# Patient Record
Sex: FEMALE | Race: WHITE | Employment: OTHER | URBAN - METROPOLITAN AREA
[De-identification: names, ages, dates, MRNs, and addresses within clinical notes are randomized per-mention and may not be internally consistent; named-entity substitution may affect disease eponyms.]

---

## 2017-12-22 PROBLEM — R55 SYNCOPE, NEAR: Status: ACTIVE | Noted: 2017-12-22

## 2017-12-22 PROBLEM — R77.8 ELEVATED TROPONIN: Status: ACTIVE | Noted: 2017-12-22

## 2017-12-22 PROBLEM — R79.89 ELEVATED TROPONIN: Status: ACTIVE | Noted: 2017-12-22
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
## 2017-12-23 PROBLEM — I25.10 CAD (CORONARY ARTERY DISEASE): Status: ACTIVE | Noted: 2017-12-23

## 2017-12-26 ENCOUNTER — TELEPHONE (OUTPATIENT)
Dept: CARDIOLOGY | Age: 82
End: 2017-12-26

## 2018-01-03 ENCOUNTER — OFFICE VISIT (OUTPATIENT)
Dept: CARDIOLOGY CLINIC | Age: 83
End: 2018-01-03

## 2018-01-03 ENCOUNTER — NURSE ONLY (OUTPATIENT)
Dept: CARDIOLOGY CLINIC | Age: 83
End: 2018-01-03

## 2018-01-03 VITALS
HEIGHT: 68 IN | OXYGEN SATURATION: 95 % | HEART RATE: 71 BPM | WEIGHT: 170 LBS | BODY MASS INDEX: 25.76 KG/M2 | SYSTOLIC BLOOD PRESSURE: 148 MMHG | DIASTOLIC BLOOD PRESSURE: 70 MMHG

## 2018-01-03 DIAGNOSIS — R55 SYNCOPE AND COLLAPSE: ICD-10-CM

## 2018-01-03 DIAGNOSIS — I25.10 CORONARY ARTERY DISEASE INVOLVING NATIVE CORONARY ARTERY OF NATIVE HEART WITHOUT ANGINA PECTORIS: Primary | ICD-10-CM

## 2018-01-03 DIAGNOSIS — E78.2 MIXED HYPERLIPIDEMIA: ICD-10-CM

## 2018-01-03 PROCEDURE — 1111F DSCHRG MED/CURRENT MED MERGE: CPT | Performed by: NURSE PRACTITIONER

## 2018-01-03 PROCEDURE — 1123F ACP DISCUSS/DSCN MKR DOCD: CPT | Performed by: NURSE PRACTITIONER

## 2018-01-03 PROCEDURE — G8427 DOCREV CUR MEDS BY ELIG CLIN: HCPCS | Performed by: NURSE PRACTITIONER

## 2018-01-03 PROCEDURE — 4040F PNEUMOC VAC/ADMIN/RCVD: CPT | Performed by: NURSE PRACTITIONER

## 2018-01-03 PROCEDURE — 1036F TOBACCO NON-USER: CPT | Performed by: NURSE PRACTITIONER

## 2018-01-03 PROCEDURE — G8484 FLU IMMUNIZE NO ADMIN: HCPCS | Performed by: NURSE PRACTITIONER

## 2018-01-03 PROCEDURE — 99214 OFFICE O/P EST MOD 30 MIN: CPT | Performed by: NURSE PRACTITIONER

## 2018-01-03 PROCEDURE — 1090F PRES/ABSN URINE INCON ASSESS: CPT | Performed by: NURSE PRACTITIONER

## 2018-01-03 PROCEDURE — G8419 CALC BMI OUT NRM PARAM NOF/U: HCPCS | Performed by: NURSE PRACTITIONER

## 2018-01-03 PROCEDURE — G8598 ASA/ANTIPLAT THER USED: HCPCS | Performed by: NURSE PRACTITIONER

## 2018-01-03 RX ORDER — MULTIVIT WITH MINERALS/LUTEIN
1000 TABLET ORAL DAILY
COMMUNITY
End: 2018-06-12 | Stop reason: ALTCHOICE

## 2018-01-03 RX ORDER — ASCORBIC ACID 500 MG
1000 TABLET ORAL DAILY
COMMUNITY

## 2018-01-03 NOTE — COMMUNICATION BODY
170 lb (77.1 kg)    Height: 5' 8\" (1.727 m)         VITALS:  BP (!) 148/70 (Site: Left Arm, Cuff Size: Medium Adult)   Pulse 71   Ht 5' 8\" (1.727 m)   Wt 170 lb (77.1 kg)   SpO2 95%   BMI 25.85 kg/m²      CONSTITUTIONAL: Cooperative, no apparent distress, and appears well nourished / developed  NEUROLOGIC:  Awake and orientated to person, place and time. PSYCH: Calm affect. SKIN: Warm and dry; Rt groin unremarkable ; mod ecchymosis Rt lateral thigh . HEENT: Sclera non-icteric, normocephalic, neck supple, no elevation of JVP, normal carotid pulses with no bruits and thyroid normal size. LUNGS:  No increased work of breathing and clear to auscultation, no crackles or wheezing. CARDIOVASCULAR:  Regular rate 72 and rhythm with no murmurs, gallops, rubs, or abnormal heart sounds, normal PMI. The apical impulses not displaced. Heart tones are crisp and normal                                                                                            Cervical veins are not engorged                 JVP less than 8 cm H2O                                                                              The carotid upstroke is normal in amplitude and contour without delay or bruit    ABDOMEN:  Normal bowel sounds, non-distended and non-tender to palpation   EXT: No edema, no calf tenderness. Pulses are present bilaterally.     DATA:    Lab Results   Component Value Date    ALT 14 12/22/2017    AST 24 12/22/2017    ALKPHOS 79 12/22/2017    BILITOT 0.4 12/22/2017     Lab Results   Component Value Date    CREATININE <0.5 (L) 12/23/2017    BUN 11 12/23/2017     (L) 12/23/2017    K 3.9 12/23/2017     12/23/2017    CO2 21 12/23/2017     No results found for: TSH, N8XHGHB, A6BXVNG, THYROIDAB  Lab Results   Component Value Date    WBC 6.9 12/23/2017    HGB 13.3 12/23/2017    HCT 39.4 12/23/2017    MCV 88.3 12/23/2017     12/23/2017     No components found for: CHLPL  Lab Results Component Value Date    TRIG 71 12/23/2017     Lab Results   Component Value Date    HDL 76 (H) 12/23/2017     Lab Results   Component Value Date    LDLCALC 101 (H) 12/23/2017     Lab Results   Component Value Date    LABVLDL 14 12/23/2017     Lab Results   Component Value Date    TROPONINI 0.11 (H) 12/23/2017       Radiology Review:  Pertinent images / reports were reviewed as a part of this visit and reveals the following:    Last Echo: 12/23/17:  Summary   Normal left ventricle size and systolic function with an estimated ejection fraction of 50-55%. Mild inferoapical hypokinesis   There is reversal of E/A inflow velocities across the mitral valve.   There is mild concentric left ventricular hypertrophy.   Trivial mitral regurgitation is present.  Suann  is mild tricuspid regurgitation with RVSP estimated at 32 mmHg. Last Angiogram: 12/22/17:  Coronary Findings  LM       Normal   LAD     40% mid  Cx        50% proximal, 80% ostial OM2  RCA     ectatic  LVG     35%, distal 50% akinetic, hyperdynamic basal segments  EDP     6mmHg     Coronary Intervention  PCI OM2 with [id number] to 14atm      Assessment:     1. Coronary artery disease involving native coronary artery of native heart without angina pectoris   ~stable: s/p PTCA OM-2 ; non-obst disease of the LAD and prox-CX  ~LVG   : EF 35%, distal 50% akinetic, hyperdynamic basal segments  ~ ejection fraction of 50-55%. Mild inferoapical hypokinesis post-revascularization by echo  ~DAPT / statin / BB  ~dry cough at HS likely from lisinopril. Currently not bothersome so prefers not to change to an ARB    2. Syncope and collapse   ~denies recurrence  ~atypical complaints of light headedness triggered by stress / anxiety   ~Holter: April '08 (done for c/o irregular HB): sinus rhythm, avg HR 88 min 63 max 113; supraventricular ectopy < 1%; ventricular ectopy 4% Cardiac event monitor   3.  Mixed hyperlipidemia   ~new  ~atorvastatin started : LDL goal < 100 LIPID PANEL    Comprehensive Metabolic Panel       Patient  is stable since hospital discharge. Plan: 30 day Event : c/o light headedness with episode of syncope PTA   Fasting lipid profile / CMP in one month     F/U in 5-6 weeks     Thank you for allowing to us to participate in the care of Arnulfo Delfinosuzanne.       [facility]

## 2018-01-03 NOTE — LETTER
[facility] - [address] [place] [address] [address] [address]  Phone: [phone]  Fax: [phone]    Karyle Fredrickson, NP        January 3, 2018     Wandy Cancino, [address] [facility] [address]    Patient: Al Ro  MR Number: [id number]  YOB: 1930  Date of Visit: 1/3/2018    Dear Dr. Saba Like:        [employer]     Outpatient Follow Up Choudhary Lock / HPI: Hospital Follow Up secondary to status post coronary artery stenting    Hospital record has been reviewed  Hospital Course progressed as follows per discharge summary:   ~pt brought in by ann marie Wayne) after falling in [place]. pt states her glasses broke last night and she is wearing on old pair of lenses. pt not sure exactly what happened but denies any dizziness, chest pain or head pain before the fall. The patient was admitted after having an unwitnessed fall vs  near syncope  while shopping at [employer]. She was brought by EMS and admitted. She was followed by cardiology and had a cath which showed: CAD   ~s/p PTCA 80% ostial OM2  ~35%, distal 50% akinetic, hyperdynamic basal segments by cath     She was treated with low dose bb and ace and was started on plavix post cath. She will follow up with cardiology for a 30 day event monitor. She was feeling better and was ambulating in the room prior to discharge. She denied any dizziness with ambulation. She was very eager to be released and declined any home care services.        Al Ro is 80 y.o. female who presents today for a routine follow up after a recent hospitalization related to the above mentioned issues. She recalls being at [place] then being told that she passed out. She remembers not feeling right, a little light headed (she attributed it to wearing an old pair of glasses). She was not having palpitations.   Subjective: Since the time of discharge, the patient admits their symptoms have improved. She's felt very good since discharge except she becomes more stressed easily. She describes feeling light headed thinking about things; maybe a little panic attack type response. She had been given ativan in the past that she rarely uses. She's worried about her son's knee procedure. She denies significant chest pain. There is SOB walking to get her meals; more tired than SOB. She denies orthopnea/PND. She denies swelling. Her weight is stable. The patient is not experiencing palpitations. These symptoms are improving over the last few days. With regard to medication therapy the patient has been compliant with prescribed regimen. They have tolerated therapy to date.      Current Outpatient Prescriptions   Medication Sig Dispense Refill    vitamin E 1000 units capsule Take 1,000 Units by mouth daily      vitamin C (ASCORBIC ACID) 500 MG tablet Take 500 mg by mouth daily      Glucosamine-Chondroit-Vit C-Mn (GLUCOSAMINE CHONDR 500 COMPLEX PO) Take 500 mg by mouth 2 times daily      atorvastatin (LIPITOR) 80 MG tablet Take 1 tablet by mouth nightly 30 tablet 3    lisinopril (PRINIVIL;ZESTRIL) 2.5 MG tablet Take 1 tablet by mouth daily 30 tablet 3    carvedilol (COREG) 3.125 MG tablet Take 1 tablet by mouth 2 times daily (with meals) (Patient taking differently: Take 3.125 mg by mouth 2 times daily (with meals) Pt taking 1/2 tablet bid) 60 tablet 3    clopidogrel (PLAVIX) 75 MG tablet Take 1 tablet by mouth daily 30 tablet 3    aspirin 81 MG chewable tablet Take 1 tablet by mouth daily 30 tablet 3    vitamin D (CHOLECALCIFEROL) 1000 UNIT TABS tablet Take 1,000 Units by mouth daily      b complex vitamins capsule Take 1 capsule by mouth daily      Coenzyme Q10 (COQ10 PO) Take by mouth      LORazepam (ATIVAN) 0.5 MG tablet       ketoconazole (NIZORAL) 2 % cream       hydrocortisone 2.5 % cream

## 2018-01-03 NOTE — PATIENT INSTRUCTIONS
Ref. Range 12/23/2017 02:55   Cholesterol, Total Latest Ref Range: 0 - 199 mg/dL 191   HDL Cholesterol Latest Ref Range: 40 - 60 mg/dL 76 (H)   LDL Calculated Latest Ref Range: <100 mg/dL 101 (H)   Triglycerides Latest Ref Range: 0 - 150 mg/dL 71     Fasting cholesterol levels in about a month    30 day heart monitor    appt in six weeks     Stop taking Vitamin-E : it has properties similar to aspirin

## 2018-02-02 PROCEDURE — 93228 REMOTE 30 DAY ECG REV/REPORT: CPT | Performed by: INTERNAL MEDICINE

## 2018-02-05 ENCOUNTER — TELEPHONE (OUTPATIENT)
Dept: CARDIOLOGY CLINIC | Age: 83
End: 2018-02-05

## 2018-02-09 ENCOUNTER — OFFICE VISIT (OUTPATIENT)
Dept: CARDIOLOGY CLINIC | Age: 83
End: 2018-02-09

## 2018-02-09 VITALS
DIASTOLIC BLOOD PRESSURE: 72 MMHG | HEIGHT: 68 IN | SYSTOLIC BLOOD PRESSURE: 138 MMHG | BODY MASS INDEX: 25.61 KG/M2 | OXYGEN SATURATION: 94 % | HEART RATE: 86 BPM | WEIGHT: 169 LBS

## 2018-02-09 DIAGNOSIS — E78.2 MIXED HYPERLIPIDEMIA: ICD-10-CM

## 2018-02-09 DIAGNOSIS — R55 SYNCOPE AND COLLAPSE: Primary | ICD-10-CM

## 2018-02-09 DIAGNOSIS — I25.10 CORONARY ARTERY DISEASE INVOLVING NATIVE CORONARY ARTERY OF NATIVE HEART WITHOUT ANGINA PECTORIS: ICD-10-CM

## 2018-02-09 PROCEDURE — 93000 ELECTROCARDIOGRAM COMPLETE: CPT | Performed by: NURSE PRACTITIONER

## 2018-02-09 PROCEDURE — 1090F PRES/ABSN URINE INCON ASSESS: CPT | Performed by: NURSE PRACTITIONER

## 2018-02-09 PROCEDURE — 99214 OFFICE O/P EST MOD 30 MIN: CPT | Performed by: NURSE PRACTITIONER

## 2018-02-09 PROCEDURE — G8419 CALC BMI OUT NRM PARAM NOF/U: HCPCS | Performed by: NURSE PRACTITIONER

## 2018-02-09 PROCEDURE — G8484 FLU IMMUNIZE NO ADMIN: HCPCS | Performed by: NURSE PRACTITIONER

## 2018-02-09 PROCEDURE — G8427 DOCREV CUR MEDS BY ELIG CLIN: HCPCS | Performed by: NURSE PRACTITIONER

## 2018-02-09 PROCEDURE — 1036F TOBACCO NON-USER: CPT | Performed by: NURSE PRACTITIONER

## 2018-02-09 PROCEDURE — G8598 ASA/ANTIPLAT THER USED: HCPCS | Performed by: NURSE PRACTITIONER

## 2018-02-09 PROCEDURE — 1123F ACP DISCUSS/DSCN MKR DOCD: CPT | Performed by: NURSE PRACTITIONER

## 2018-02-09 PROCEDURE — 4040F PNEUMOC VAC/ADMIN/RCVD: CPT | Performed by: NURSE PRACTITIONER

## 2018-02-09 RX ORDER — LOSARTAN POTASSIUM 25 MG/1
25 TABLET ORAL DAILY
Qty: 30 TABLET | Refills: 5 | Status: SHIPPED | OUTPATIENT
Start: 2018-02-09 | End: 2018-08-16 | Stop reason: SDUPTHER

## 2018-02-09 NOTE — LETTER
[address] [facility]  [address]  [address] [address] [address]  Phone: [phone]  Fax: [phone]    Leonides Esposito NP        February 9, 2018     Yolis Knowles, [address] [facility] [address]    Patient: Colleen Norton  MR Number: [id number]  YOB: 1930  Date of Visit: 2/9/2018    Dear Dr. Yolis Knowles:      [address]     Outpatient Follow Up Note    Colleen Norton is 80 y.o. female who presents today with a history of CAD s/p PTCA OM-2 Dec '17, CM/EF 35% improved to 50-55% with mild inferoapical HK on echo POD #1, syncope and hyperlipidemia. CHIEF COMPLAINT / HPI:  Follow Up secondary to syncope PTA / 30 day heart monitor. Subjective:   she denies significant chest pain. There is no SOB/ZIMMERMAN. The patient denies orthopnea/PND. The patient does not have swelling. The patients weight is unchanged . The patient is not experiencing palpitations or dizziness. She becomes light headedness when she get excited about anything. She feels dizzy bending over (at the waist). These symptoms are stable since the last OV. With regard to medication therapy the patient has been compliant with prescribed regimen. They have tolerated therapy to date.        Current Outpatient Prescriptions   Medication Sig Dispense Refill    vitamin E 1000 units capsule Take 1,000 Units by mouth daily      vitamin C (ASCORBIC ACID) 500 MG tablet Take 500 mg by mouth daily      Glucosamine-Chondroit-Vit C-Mn (GLUCOSAMINE CHONDR 500 COMPLEX PO) Take 500 mg by mouth 2 times daily      atorvastatin (LIPITOR) 80 MG tablet Take 1 tablet by mouth nightly 30 tablet 3    lisinopril (PRINIVIL;ZESTRIL) 2.5 MG tablet Take 1 tablet by mouth daily 30 tablet 3    carvedilol (COREG) 3.125 MG tablet Take 1 tablet by mouth 2 times daily (with meals) (Patient taking differently: Take 3.125 mg by mouth 2 times daily (with meals) Pt taking 1/2 tablet bid) 60 tablet 3 Radiology Review:  Pertinent images / reports were reviewed as a part of this visit and reveals the followin day Event monitor: 1/3 - 18:   ~avg HR 76 min 54 max 120    ~PVC burden 1%   ~9 bts PSVT (asymptomatic)   ~symptoms: c/o light headedness in sinus rhythm      Assessment:     1. Syncope and collapse   ~denies recurrence  ~c/o dizziness bending at the waist  ~Event monitor: sinus rhythm, PVC burden 1%; 9 bts PSVT asymptomatic  ~low dose carvedilol / ASA   ~Holter:  (done for c/o irregular HB): sinus rhythm, avg HR 88 min 63 max 113; supraventricular ectopy < 1%; ventricular ectopy 4%  EKG 12 Lead   2. Coronary artery disease involving native coronary artery of native heart without angina pectoris   ~s/p PTCA OM  ~denies chest pain   ~DAPT / BB EKG 12 Lead   3. Mixed hyperlipidemia   ~LDL near goal; HDL high   ~atorvastatin 80 mg daily           I had the opportunity to review the clinical symptoms and presentation of Sadie Radford. Plan:     1. EKG: sinus rhythm. ICRBBB  2. Stop lisinopril d/t cough-tickle   Begin losartan 25 mg daily  3. F/U in 3-4 months    Overall the patient is stable from CV standpoint    Thank you for allowing to us to participate in the care of Sadie Radford. If you have questions, please do not hesitate to call me. I look forward to following Trista Roman along with you.     Sincerely,        Chad Dumont NP

## 2018-02-09 NOTE — PROGRESS NOTES
[address]     Outpatient Follow Up Note    Rexine Screen is 80 y.o. female who presents today with a history of CAD s/p PTCA OM-2 Dec '17, CM/EF 35% improved to 50-55% with mild inferoapical HK on echo POD #1, syncope and hyperlipidemia. CHIEF COMPLAINT / HPI:  Follow Up secondary to syncope PTA / 30 day heart monitor. Subjective:   she denies significant chest pain. There is no SOB/ZIMMERMAN. The patient denies orthopnea/PND. The patient does not have swelling. The patients weight is unchanged . The patient is not experiencing palpitations or dizziness. She becomes light headedness when she get excited about anything. She feels dizzy bending over (at the waist). These symptoms are stable since the last OV. With regard to medication therapy the patient has been compliant with prescribed regimen. They have tolerated therapy to date. History reviewed. No pertinent past medical history.   Social History:    History   Smoking Status    Former Smoker   Smokeless Tobacco    Never Used     Current Medications:  Current Outpatient Prescriptions   Medication Sig Dispense Refill    vitamin E 1000 units capsule Take 1,000 Units by mouth daily      vitamin C (ASCORBIC ACID) 500 MG tablet Take 500 mg by mouth daily      Glucosamine-Chondroit-Vit C-Mn (GLUCOSAMINE CHONDR 500 COMPLEX PO) Take 500 mg by mouth 2 times daily      atorvastatin (LIPITOR) 80 MG tablet Take 1 tablet by mouth nightly 30 tablet 3    lisinopril (PRINIVIL;ZESTRIL) 2.5 MG tablet Take 1 tablet by mouth daily 30 tablet 3    carvedilol (COREG) 3.125 MG tablet Take 1 tablet by mouth 2 times daily (with meals) (Patient taking differently: Take 3.125 mg by mouth 2 times daily (with meals) Pt taking 1/2 tablet bid) 60 tablet 3    clopidogrel (PLAVIX) 75 MG tablet Take 1 tablet by mouth daily 30 tablet 3    aspirin 81 MG chewable tablet Take 1 tablet by mouth daily 30 tablet 3    vitamin D (CHOLECALCIFEROL) 1000 UNIT TABS with no murmurs, gallops, rubs, or abnormal heart sounds, normal PMI. The apical impulses not displaced  JVP less than 8 cm H2O  Heart tones are crisp and normal  Cervical veins are not engorged  The carotid upstroke is normal in amplitude and contour without delay or bruit  JVP is not elevated  ABDOMEN:  Normal bowel sounds, non-distended and non-tender to palpation  EXT: No edema, no calf tenderness. Pulses are present bilaterally. DATA:    Lab Results   Component Value Date    ALT 14 2017    AST 24 2017    ALKPHOS 79 2017    BILITOT 0.4 2017     Lab Results   Component Value Date    CREATININE <0.5 (L) 2017    BUN 11 2017     (L) 2017    K 3.9 2017     2017    CO2 21 2017     No results found for: TSH, A0PGCLG, T0HIDZJ, THYROIDAB  Lab Results   Component Value Date    WBC 6.9 2017    HGB 13.3 2017    HCT 39.4 2017    MCV 88.3 2017     2017     No components found for: CHLPL  Lab Results   Component Value Date    TRIG 71 2017     Lab Results   Component Value Date    HDL 76 (H) 2017     Lab Results   Component Value Date    LDLCALC 101 (H) 2017     Lab Results   Component Value Date    LABVLDL 14 2017     Radiology Review:  Pertinent images / reports were reviewed as a part of this visit and reveals the followin day Event monitor: 1/3 - 18:   ~avg HR 76 min 54 max 120    ~PVC burden 1%   ~9 bts PSVT (asymptomatic)   ~symptoms: c/o light headedness in sinus rhythm    Echo: 17:  Summary   Normal left ventricle size and systolic function with an estimated ejection fraction of 50-55%.  Mild inferoapical hypokinesis   There is reversal of E/A inflow velocities across the mitral valve.   There is mild concentric left ventricular hypertrophy.   Trivial mitral regurgitation is present.  Zelpha Jeanette is mild tricuspid regurgitation with RVSP estimated at 32 mmHg.        Last understanding not to stop medications without discussing with us. Discussed exercise: 30-60 minutes 7 days/week. Trying to use exercise at [facility]. Her knee limits her  Discussed Low saturated fat diet. Thank you for allowing to us to participate in the care of Vinod Rodriguez.     WILLIE Oleary    Documentation of today's visit sent to PCP

## 2018-02-09 NOTE — COMMUNICATION BODY
tablet       hydrocortisone 2.5 % cream       multivitamin (ANTIOXIDANT;PROSIGHT) TABS per tablet Take 1 tablet by mouth daily.  ketoconazole (NIZORAL) 2 % cream          Objective:   PHYSICAL EXAM:       Vitals:    18 0957 18 1008   BP: 122/74 138/72   Site: Left Arm    Position: Sitting    Cuff Size: Large Adult    Pulse: 86    SpO2: 94%    Weight: 169 lb (76.7 kg)    Height: 5' 8\" (1.727 m)        VITALS:  /74 (Site: Left Arm, Position: Sitting, Cuff Size: Large Adult)   Pulse 86   Ht 5' 8\" (1.727 m)   Wt 169 lb (76.7 kg)   SpO2 94%   BMI 25.70 kg/m²    CONSTITUTIONAL: Cooperative, no apparent distress, and appears well nourished / developed  NEUROLOGIC:  Awake and orientated to person, place and time. PSYCH: Calm affect. SKIN: Warm and dry. HEENT: Sclera non-icteric, normocephalic, neck supple, no elevation of JVP, normal carotid pulses with no bruits and thyroid normal size. LUNGS:  No increased work of breathing and clear to auscultation, no crackles or wheezing  CARDIOVASCULAR:  Regular/irreg rate 80 and rhythm with no murmurs, gallops, rubs, or abnormal heart sounds, normal PMI. The apical impulses not displaced  JVP less than 8 cm H2O  Heart tones are crisp and normal  Cervical veins are not engorged  The carotid upstroke is normal in amplitude and contour without delay or bruit  JVP is not elevated  ABDOMEN:  Normal bowel sounds, non-distended and non-tender to palpation  EXT: No edema, no calf tenderness. Pulses are present bilaterally. DATA:      Radiology Review:  Pertinent images / reports were reviewed as a part of this visit and reveals the followin day Event monitor: 1/3 - 18:   ~avg HR 76 min 54 max 120    ~PVC burden 1%   ~9 bts PSVT (asymptomatic)   ~symptoms: c/o light headedness in sinus rhythm      Assessment:     1.  Syncope and collapse   ~denies recurrence  ~c/o dizziness bending at the waist  ~Event monitor: sinus rhythm, PVC burden 1%; 9 bts

## 2018-03-21 ENCOUNTER — TELEPHONE (OUTPATIENT)
Dept: CARDIOLOGY CLINIC | Age: 83
End: 2018-03-21

## 2018-04-11 PROBLEM — R79.89 ELEVATED TROPONIN: Status: RESOLVED | Noted: 2017-12-22 | Resolved: 2018-04-11

## 2018-04-11 PROBLEM — R77.8 ELEVATED TROPONIN: Status: RESOLVED | Noted: 2017-12-22 | Resolved: 2018-04-11
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
## 2018-04-16 ENCOUNTER — TELEPHONE (OUTPATIENT)
Dept: CARDIOLOGY CLINIC | Age: 83
End: 2018-04-16

## 2018-04-20 ENCOUNTER — TELEPHONE (OUTPATIENT)
Dept: CARDIOLOGY CLINIC | Age: 83
End: 2018-04-20

## 2018-04-20 RX ORDER — CLOPIDOGREL BISULFATE 75 MG/1
75 TABLET ORAL DAILY
Qty: 30 TABLET | Refills: 5 | Status: SHIPPED | OUTPATIENT
Start: 2018-04-20 | End: 2018-08-15 | Stop reason: SDUPTHER

## 2018-04-24 RX ORDER — ATORVASTATIN CALCIUM 80 MG/1
80 TABLET, FILM COATED ORAL NIGHTLY
Qty: 30 TABLET | Refills: 5 | Status: SHIPPED | OUTPATIENT
Start: 2018-04-24 | End: 2018-06-14 | Stop reason: SDUPTHER

## 2018-06-12 ENCOUNTER — OFFICE VISIT (OUTPATIENT)
Dept: CARDIOLOGY CLINIC | Age: 83
End: 2018-06-12

## 2018-06-12 VITALS
HEART RATE: 84 BPM | HEIGHT: 68 IN | DIASTOLIC BLOOD PRESSURE: 72 MMHG | SYSTOLIC BLOOD PRESSURE: 128 MMHG | WEIGHT: 169.7 LBS | BODY MASS INDEX: 25.72 KG/M2

## 2018-06-12 DIAGNOSIS — R55 SYNCOPE AND COLLAPSE: Primary | ICD-10-CM

## 2018-06-12 DIAGNOSIS — I25.10 CORONARY ARTERY DISEASE INVOLVING NATIVE CORONARY ARTERY OF NATIVE HEART WITHOUT ANGINA PECTORIS: ICD-10-CM

## 2018-06-12 DIAGNOSIS — E78.2 MIXED HYPERLIPIDEMIA: ICD-10-CM

## 2018-06-12 PROCEDURE — 1123F ACP DISCUSS/DSCN MKR DOCD: CPT | Performed by: NURSE PRACTITIONER

## 2018-06-12 PROCEDURE — G8598 ASA/ANTIPLAT THER USED: HCPCS | Performed by: NURSE PRACTITIONER

## 2018-06-12 PROCEDURE — 1036F TOBACCO NON-USER: CPT | Performed by: NURSE PRACTITIONER

## 2018-06-12 PROCEDURE — G8427 DOCREV CUR MEDS BY ELIG CLIN: HCPCS | Performed by: NURSE PRACTITIONER

## 2018-06-12 PROCEDURE — G8419 CALC BMI OUT NRM PARAM NOF/U: HCPCS | Performed by: NURSE PRACTITIONER

## 2018-06-12 PROCEDURE — 4040F PNEUMOC VAC/ADMIN/RCVD: CPT | Performed by: NURSE PRACTITIONER

## 2018-06-12 PROCEDURE — 99214 OFFICE O/P EST MOD 30 MIN: CPT | Performed by: NURSE PRACTITIONER

## 2018-06-12 PROCEDURE — 1090F PRES/ABSN URINE INCON ASSESS: CPT | Performed by: NURSE PRACTITIONER

## 2018-06-18 RX ORDER — ATORVASTATIN CALCIUM 80 MG/1
TABLET, FILM COATED ORAL
Qty: 30 TABLET | Refills: 5 | Status: SHIPPED | OUTPATIENT
Start: 2018-06-18 | End: 2019-03-21

## 2018-08-15 ENCOUNTER — TELEPHONE (OUTPATIENT)
Dept: CARDIOLOGY CLINIC | Age: 83
End: 2018-08-15

## 2018-08-15 RX ORDER — CLOPIDOGREL BISULFATE 75 MG/1
75 TABLET ORAL DAILY
Qty: 90 TABLET | Refills: 1 | Status: SHIPPED | OUTPATIENT
Start: 2018-08-15 | End: 2019-05-01 | Stop reason: ALTCHOICE

## 2018-08-16 RX ORDER — LOSARTAN POTASSIUM 25 MG/1
25 TABLET ORAL DAILY
Qty: 30 TABLET | Refills: 5 | Status: SHIPPED | OUTPATIENT
Start: 2018-08-16 | End: 2019-02-10 | Stop reason: SDUPTHER

## 2018-08-21 RX ORDER — CARVEDILOL 3.12 MG/1
1.56 TABLET ORAL 2 TIMES DAILY WITH MEALS
Qty: 90 TABLET | Refills: 3 | Status: SHIPPED | OUTPATIENT
Start: 2018-08-21 | End: 2018-10-31 | Stop reason: ALTCHOICE

## 2018-09-05 ENCOUNTER — TELEPHONE (OUTPATIENT)
Dept: CARDIOLOGY CLINIC | Age: 83
End: 2018-09-05

## 2018-09-05 NOTE — TELEPHONE ENCOUNTER
She already takes an aspirin along with antiplt so she doesn't need any more.  She can f/u with Felix Diallo

## 2018-09-06 ENCOUNTER — OFFICE VISIT (OUTPATIENT)
Dept: CARDIOLOGY CLINIC | Age: 83
End: 2018-09-06

## 2018-09-06 VITALS
HEART RATE: 73 BPM | HEIGHT: 68 IN | DIASTOLIC BLOOD PRESSURE: 78 MMHG | WEIGHT: 170.4 LBS | SYSTOLIC BLOOD PRESSURE: 126 MMHG | BODY MASS INDEX: 25.82 KG/M2

## 2018-09-06 DIAGNOSIS — R00.2 PALPITATIONS: Primary | ICD-10-CM

## 2018-09-06 DIAGNOSIS — I25.10 CORONARY ARTERY DISEASE INVOLVING NATIVE CORONARY ARTERY OF NATIVE HEART WITHOUT ANGINA PECTORIS: ICD-10-CM

## 2018-09-06 PROCEDURE — G8598 ASA/ANTIPLAT THER USED: HCPCS | Performed by: NURSE PRACTITIONER

## 2018-09-06 PROCEDURE — 1090F PRES/ABSN URINE INCON ASSESS: CPT | Performed by: NURSE PRACTITIONER

## 2018-09-06 PROCEDURE — 1101F PT FALLS ASSESS-DOCD LE1/YR: CPT | Performed by: NURSE PRACTITIONER

## 2018-09-06 PROCEDURE — G8419 CALC BMI OUT NRM PARAM NOF/U: HCPCS | Performed by: NURSE PRACTITIONER

## 2018-09-06 PROCEDURE — 4040F PNEUMOC VAC/ADMIN/RCVD: CPT | Performed by: NURSE PRACTITIONER

## 2018-09-06 PROCEDURE — G8427 DOCREV CUR MEDS BY ELIG CLIN: HCPCS | Performed by: NURSE PRACTITIONER

## 2018-09-06 PROCEDURE — 99214 OFFICE O/P EST MOD 30 MIN: CPT | Performed by: NURSE PRACTITIONER

## 2018-09-06 PROCEDURE — 93000 ELECTROCARDIOGRAM COMPLETE: CPT | Performed by: NURSE PRACTITIONER

## 2018-09-06 PROCEDURE — 1036F TOBACCO NON-USER: CPT | Performed by: NURSE PRACTITIONER

## 2018-09-06 PROCEDURE — 1123F ACP DISCUSS/DSCN MKR DOCD: CPT | Performed by: NURSE PRACTITIONER

## 2018-09-06 RX ORDER — LISINOPRIL 2.5 MG/1
2.5 TABLET ORAL DAILY
COMMUNITY
End: 2018-09-06

## 2018-09-06 RX ORDER — ASCORBIC ACID 500 MG
CAPSULE, EXTENDED RELEASE ORAL DAILY
COMMUNITY
End: 2021-07-29 | Stop reason: ALTCHOICE

## 2018-09-06 RX ORDER — CALCIUM CARBONATE 500(1250)
500 TABLET ORAL DAILY
COMMUNITY

## 2018-09-06 NOTE — PROGRESS NOTES
clots, bruising or jaundice  · Genito-Urinary:  negative for - Dysuria or incontinence  · Musculoskeletal: negative for - Joint swelling, muscle pain  · Neurological: negative for - confusion, dizziness, headaches   · Psychiatric: No anxiety, no depression. · Dermatological: negative for - rash    Physical Examination:  Vitals:    09/06/18 1357   BP: 126/78   Pulse: 73        · Constitutional: Oriented. No distress. · Head: Normocephalic and atraumatic. · Mouth/Throat: Oropharynx is clear and moist.   · Eyes: Conjunctivae normal. EOM are normal.   · Neck: Neck supple. No rigidity. No JVD present. · Cardiovascular: Normal rate, regular rhythm, S1&S2. · Pulmonary/Chest: Bilateral respiratory sounds. No wheezes, No rhonchi. · Abdominal: Soft. Bowel sounds present. No distension, No tenderness. · Musculoskeletal: No tenderness. No edema    · Lymphadenopathy: Has no cervical adenopathy. · Neurological: Alert and oriented. Cranial nerve appears intact, No Gross deficit   · Skin: Skin is warm and dry. No rash noted. · Psychiatric: Has a normal behavior     Labs, diagnostic and imaging results reviewed. ECG: SR, -incomplete RBBB and anterior fascicular block. Left atrial enlargement. Rate 73    30 day Event monitor: 1/3 - 2/1/18:              ~avg HR 76 min 54 max 120               ~PVC burden 1%              ~9 bts PSVT (asymptomatic)              ~symptoms: c/o light headedness in sinus rhythm     Echo: 12/23/17:  Summary   Normal left ventricle size and systolic function with an estimated ejection fraction of 50-55%. Mild inferoapical hypokinesis   There is reversal of E/A inflow velocities across the mitral valve. There is mild concentric left ventricular hypertrophy. Trivial mitral regurgitation is present. There is mild tricuspid regurgitation with RVSP estimated at 32 mmHg.         Last Angiogram: 12/22/17:  Coronary Findings  LM       Normal   LAD     40% mid  Cx        50% proximal, 80% ostial OM2  RCA     ectatic  LVG     35%, distal 50% akinetic, hyperdynamic basal segments  EDP     6mmHg     Coronary Intervention  PCI OM2 with [id number] to 14atm         Medication:  Current Outpatient Prescriptions   Medication Sig Dispense Refill    calcium carbonate (OSCAL) 500 MG TABS tablet Take 1,200 mg by mouth daily      Soya Lecithin 1200 MG CAPS Take by mouth daily      Omega-3 Fatty Acids (OMEGA-3 EPA FISH OIL PO) Take 1,000 mg by mouth daily      Misc Natural Products (OSTEO BI-FLEX ADV TRIPLE ST PO) Take 2 tablets by mouth daily      carvedilol (COREG) 3.125 MG tablet Take 0.5 tablets by mouth 2 times daily (with meals) 90 tablet 3    losartan (COZAAR) 25 MG tablet Take 1 tablet by mouth daily 30 tablet 5    clopidogrel (PLAVIX) 75 MG tablet Take 1 tablet by mouth daily 90 tablet 1    atorvastatin (LIPITOR) 80 MG tablet TAKE 1 TABLET BY MOUTH EVERY NIGHT 30 tablet 5    vitamin C (ASCORBIC ACID) 500 MG tablet Take 1,000 mg by mouth daily       Glucosamine-Chondroit-Vit C-Mn (GLUCOSAMINE CHONDR 500 COMPLEX PO) Take 500 mg by mouth 2 times daily      aspirin 81 MG chewable tablet Take 1 tablet by mouth daily 30 tablet 3    vitamin D (CHOLECALCIFEROL) 1000 UNIT TABS tablet Take 1,000 Units by mouth daily      b complex vitamins capsule Take 1 capsule by mouth daily      Coenzyme Q10 (COQ10 PO) Take by mouth      LORazepam (ATIVAN) 0.5 MG tablet as needed. Sofiya Arce ketoconazole (NIZORAL) 2 % cream       hydrocortisone 2.5 % cream       multivitamin (ANTIOXIDANT;PROSIGHT) TABS per tablet Take 1 tablet by mouth daily. No current facility-administered medications for this visit. Assessment  80 y.o. female w/PMHx of CAD s/p PTCA OM-2 Dec '17, CM/EF 35% improved to 50-55%, syncope and HLD. She has been followed by general cardiology for CAD and complaints of dizziness/syncope. Syncope occurred while shopping at [employer] 12/2017 w/out preceding symptoms.   Went to ER was eval by cards w/echo and event monitor. She has not previously been established with EP and presents to office today w/CC of palpitations. She was very stressed on Sunday and felt like her heart was going in and out of rhythm and pulse was irregular on Monday. Denies cp/pressure, syncope/presyncope. Palpitations   -Earlier this week as above  -No previous diagnosis of AF/AFl  -Event monitor 1/2018 w/out sig arrhythmias  -SR on EKG today  -She is afraid she has AF  -Discussed and encouraged repeating event monitor vs ILR since symptoms do not occur frequently. Rec ILR  -On ASA/Plavix/BB. -No oac or med changes at this time since no doc. arrhythmia     Hx Syncope  -While shopping at [employer] 12/2017 w/out preceding symptoms. Went to ER was eval by cards w/echo and event monitor  -30 day Event monitor: 1/3 - 2/1/18: no significant arrhythmias noted              ~avg HR 76 min 54 max 120               ~PVC burden 1%              ~9 bts PSVT (asymptomatic)              ~symptoms: c/o light headedness in sinus rhythm  -W/out sig findings for syncope cause  -Can not r/out an arrhythmia. Discussed monitor as above    CAD  -Stable  -Followed by Ale Duran  -On ASA/Plavix/statin/BB/ARB    HLD  -On atovastatin 80    Plan  1) No medication changes    2) Pt to consider ILR vs repeat event monitor    3) Keep follow up with Dr. Rigo Arriaga    4) Call with any questions or updates [phone]    5) Keep blood pressure log      Thank you for allowing me to participate in the care of Ty De Jesus. Further evaluation will be based upon the patient's clinical course and testing results. All questions and concerns were addressed to the patient/family. Alternatives to my treatment were discussed. I have discussed the above stated plan and the patient verbalized understanding and agreed with the plan. NOTE: This report was transcribed using voice recognition software.  Every effort was made to ensure accuracy,

## 2018-09-06 NOTE — PATIENT INSTRUCTIONS
1) No medication changes    2) Consider loop recorder    3) Keep follow up with Dr. Mila Broderick    4) Call with any questions or updates [phone]    5) Keep blood pressure log

## 2018-09-19 PROBLEM — R00.2 PALPITATIONS: Status: ACTIVE | Noted: 2018-09-19

## 2018-10-15 RX ORDER — CLOPIDOGREL BISULFATE 75 MG/1
75 TABLET ORAL DAILY
Qty: 30 TABLET | Refills: 3 | Status: SHIPPED | OUTPATIENT
Start: 2018-10-15 | End: 2018-10-31 | Stop reason: SDUPTHER

## 2018-10-15 RX ORDER — ATORVASTATIN CALCIUM 80 MG/1
TABLET, FILM COATED ORAL
Qty: 30 TABLET | Refills: 3 | Status: SHIPPED | OUTPATIENT
Start: 2018-10-15 | End: 2018-10-31 | Stop reason: SDUPTHER

## 2018-10-15 RX ORDER — CLOPIDOGREL BISULFATE 75 MG/1
75 TABLET ORAL DAILY
Qty: 30 TABLET | Refills: 3 | OUTPATIENT
Start: 2018-10-15

## 2018-10-15 NOTE — TELEPHONE ENCOUNTER
LAST APPT ON  09/06/18  NEXT APPT ON 10/31/18  LAST LIPID ON 12/23/17    SWITCHED PHARMACY'S     PT STATED SHE IS USING [facility] NOT [facility]    SENT MEDICATIONS TO PHARMACY

## 2018-10-24 PROBLEM — E78.00 HYPERCHOLESTEREMIA: Status: ACTIVE | Noted: 2018-10-24

## 2018-10-31 ENCOUNTER — OFFICE VISIT (OUTPATIENT)
Dept: CARDIOLOGY CLINIC | Age: 83
End: 2018-10-31
Payer: MEDICARE

## 2018-10-31 VITALS
HEART RATE: 65 BPM | BODY MASS INDEX: 25.61 KG/M2 | HEIGHT: 68 IN | SYSTOLIC BLOOD PRESSURE: 126 MMHG | DIASTOLIC BLOOD PRESSURE: 66 MMHG | WEIGHT: 169 LBS

## 2018-10-31 DIAGNOSIS — E78.00 HYPERCHOLESTEREMIA: ICD-10-CM

## 2018-10-31 DIAGNOSIS — R00.2 PALPITATIONS: ICD-10-CM

## 2018-10-31 DIAGNOSIS — I25.10 CORONARY ARTERY DISEASE INVOLVING NATIVE CORONARY ARTERY OF NATIVE HEART WITHOUT ANGINA PECTORIS: Primary | ICD-10-CM

## 2018-10-31 PROCEDURE — G8598 ASA/ANTIPLAT THER USED: HCPCS | Performed by: INTERNAL MEDICINE

## 2018-10-31 PROCEDURE — 1036F TOBACCO NON-USER: CPT | Performed by: INTERNAL MEDICINE

## 2018-10-31 PROCEDURE — 1090F PRES/ABSN URINE INCON ASSESS: CPT | Performed by: INTERNAL MEDICINE

## 2018-10-31 PROCEDURE — G8419 CALC BMI OUT NRM PARAM NOF/U: HCPCS | Performed by: INTERNAL MEDICINE

## 2018-10-31 PROCEDURE — 99214 OFFICE O/P EST MOD 30 MIN: CPT | Performed by: INTERNAL MEDICINE

## 2018-10-31 PROCEDURE — 4040F PNEUMOC VAC/ADMIN/RCVD: CPT | Performed by: INTERNAL MEDICINE

## 2018-10-31 PROCEDURE — G8427 DOCREV CUR MEDS BY ELIG CLIN: HCPCS | Performed by: INTERNAL MEDICINE

## 2018-10-31 PROCEDURE — 1101F PT FALLS ASSESS-DOCD LE1/YR: CPT | Performed by: INTERNAL MEDICINE

## 2018-10-31 PROCEDURE — G8484 FLU IMMUNIZE NO ADMIN: HCPCS | Performed by: INTERNAL MEDICINE

## 2018-10-31 PROCEDURE — 1123F ACP DISCUSS/DSCN MKR DOCD: CPT | Performed by: INTERNAL MEDICINE

## 2018-10-31 RX ORDER — LISINOPRIL 2.5 MG/1
2.5 TABLET ORAL DAILY
COMMUNITY
End: 2018-10-31 | Stop reason: ALTCHOICE

## 2018-10-31 NOTE — COMMUNICATION BODY
[address]       H+P // CONSULT // OUTPATIENT VISIT // [facility] VISIT     Referring Doctor Brice Jones MD   Encounter Type Followup     CHIEF COMPLAINT     VisitType [] Acute [x] Chronic     Sxs [x] None [] CP [] SOB [] Dizzy [] Palps [] Fatigue     Problems CAD, CHOL, Palpitations      HISTORY OF PRESENT ILLNESS     Doing well. Denies cp, sob. Compliant with meds. Symptom Y N Frequency Duration Severity Modify Assoc Sx Other   CP [] [x]         SOB [] [x]         Dizzy [] [x]         Syncope [] [x]         Palpitations [] [x]           COMPLIANCE     Category Meds Diet Salt Exercise Tobacco Alcohol Drugs   Compliant [x] [x] [x] [x] [x] [x] [x]   [x]Counseling given on all above above categories    HISTORY/ALLERGIES/ROS     MedHx:  has a past medical history of CAD (coronary artery disease) and Hyperlipidemia. SurgHx:  has a past surgical history that includes joint replacement and Carpal tunnel release. SocHx:   reports that she has quit smoking. She has never used smokeless tobacco. She reports that she drinks alcohol. She reports that she does not use drugs. FamHx: family history includes Heart Attack in her brother; Heart Disease in her father.    Allergies: Lisinopril   ROS:  [x]Full ROS obtained and negative except as mentioned in HPI     MEDICATIONS      Current Outpatient Prescriptions   Medication Sig Dispense Refill    atorvastatin (LIPITOR) 80 MG tablet TAKE 1 TABLET BY MOUTH EVERY NIGHT 30 tablet 3    clopidogrel (PLAVIX) 75 MG tablet TAKE 1 TABLET BY MOUTH DAILY 30 tablet 3    calcium carbonate (OSCAL) 500 MG TABS tablet Take 1,200 mg by mouth daily      Soya Lecithin 1200 MG CAPS Take by mouth daily      Omega-3 Fatty Acids (OMEGA-3 EPA FISH OIL PO) Take 1,000 mg by mouth daily      Misc Natural Products (OSTEO BI-FLEX ADV TRIPLE ST PO) Take 2 tablets by mouth daily      carvedilol (COREG) 3.125 MG tablet Take 0.5 tablets by mouth 2 times daily (with meals) 90 tablet

## 2019-02-13 RX ORDER — CLOPIDOGREL BISULFATE 75 MG/1
75 TABLET ORAL DAILY
Qty: 90 TABLET | Refills: 1 | Status: SHIPPED | OUTPATIENT
Start: 2019-02-13 | End: 2019-05-01 | Stop reason: ALTCHOICE

## 2019-02-13 RX ORDER — LOSARTAN POTASSIUM 25 MG/1
TABLET ORAL
Qty: 90 TABLET | Refills: 1 | Status: SHIPPED | OUTPATIENT
Start: 2019-02-13 | End: 2019-05-01 | Stop reason: ALTCHOICE

## 2019-03-21 RX ORDER — ATORVASTATIN CALCIUM 80 MG/1
TABLET, FILM COATED ORAL
Qty: 30 TABLET | Refills: 0 | Status: SHIPPED | OUTPATIENT
Start: 2019-03-21 | End: 2019-04-19 | Stop reason: SDUPTHER

## 2019-04-22 RX ORDER — ATORVASTATIN CALCIUM 80 MG/1
TABLET, FILM COATED ORAL
Qty: 90 TABLET | Refills: 1 | Status: SHIPPED | OUTPATIENT
Start: 2019-04-22 | End: 2020-03-18

## 2019-04-22 NOTE — PROGRESS NOTES
mouth daily 90 tablet 1    vitamin C (ASCORBIC ACID) 500 MG tablet Take 1,000 mg by mouth daily       Glucosamine-Chondroit-Vit C-Mn (GLUCOSAMINE CHONDR 500 COMPLEX PO) Take 500 mg by mouth 2 times daily      aspirin 81 MG chewable tablet Take 1 tablet by mouth daily 30 tablet 3    vitamin D (CHOLECALCIFEROL) 1000 UNIT TABS tablet Take 1,000 Units by mouth daily      b complex vitamins capsule Take 1 capsule by mouth daily      Coenzyme Q10 (COQ10 PO) Take by mouth      ketoconazole (NIZORAL) 2 % cream       hydrocortisone 2.5 % cream        No current facility-administered medications for this visit.       Reviewed with patient and will remain unchanged except as mentioned in A/P  PHYSICAL EXAM     Vitals:    05/01/19 0927   BP: 126/80   Pulse: 80      Gen Alert, coop, no distress Heart  RRR, no MRG, nl apical impulse   Head NC, AT, no abnorm Abd  Soft, NT, +BS, no mass, no OM   Eyes PER, conj/corn clear Ext  Ext nl, AT, no C/C/E   Nose Nares nl, no drain, NT Pulse 2+ and symmetric   Throat Lips, mucosa, tongue nl Skin Col/text/turg nl, no vis rash/les   Neck S/S, TM, NT, no bruit/JVD Psych Nl mood and affect   Lung CTA-B, unlabored, no DTP Lymph   No cervical or axillary LA   Ch wall NT, no deform Neuro  Nl gross M/S exam     ASSESSMENT AND PLAN     ~CAD   Date EF Results   Sx   No concerning   [facility]   [x]I         []II           []III          []IV   Hx   SVPCI   C 12/17 35% PCI of OM2 (Emelia)   MPI   None   TTE 12/17 55% Mild inferoapical hypokinesis   Plan   Continue aggressive medical treatment at doses above  No BB due to low HR and BP  Stop losartan due to bp   Stop plavix and continue asa 81   ~Hyperchol  Goal LDL <70   Counseling Counseled on diet, exercise and ideal body weight   Plan PCP liver/lipid surveillance   8/18: HDL:73, LDL:58  ~Palpitations  30 day monitor: no significant arrythmias  Plan Observation  ~Compliance  Questionable compliance with memory issues  Plan Encouraged compliance, simplified regimen, printed active meds for patient.  ~Followup  Interval: 6 months  Tests/Labs: None    Scribe Attestation  IShamar, am scribing for and in the presence of Jeaneth Martinez MD.   SignedShamar 04/22/19 2:10 PM   Provider Mikey Sigrid is working as a scribe for and in the presence of andrews Martinez MD). Working as a scribe, Shamar Seth may have prepopulated components of this note with my historical  intellectual property under my direct supervision. Any additions to this intellectual property were performed in my presence and at my direction.   Furthermore, the content and accuracy of this note have been reviewed by andrews Martinez MD).  5/1/2019 9:34 AM

## 2019-05-01 ENCOUNTER — OFFICE VISIT (OUTPATIENT)
Dept: CARDIOLOGY CLINIC | Age: 84
End: 2019-05-01
Payer: MEDICARE

## 2019-05-01 VITALS
BODY MASS INDEX: 25.46 KG/M2 | HEART RATE: 80 BPM | DIASTOLIC BLOOD PRESSURE: 80 MMHG | WEIGHT: 168 LBS | SYSTOLIC BLOOD PRESSURE: 126 MMHG | HEIGHT: 68 IN

## 2019-05-01 DIAGNOSIS — I25.10 CORONARY ARTERY DISEASE INVOLVING NATIVE CORONARY ARTERY OF NATIVE HEART WITHOUT ANGINA PECTORIS: Primary | ICD-10-CM

## 2019-05-01 DIAGNOSIS — E78.00 HYPERCHOLESTEREMIA: ICD-10-CM

## 2019-05-01 DIAGNOSIS — R00.2 PALPITATIONS: ICD-10-CM

## 2019-05-01 PROCEDURE — G8427 DOCREV CUR MEDS BY ELIG CLIN: HCPCS | Performed by: INTERNAL MEDICINE

## 2019-05-01 PROCEDURE — G8419 CALC BMI OUT NRM PARAM NOF/U: HCPCS | Performed by: INTERNAL MEDICINE

## 2019-05-01 PROCEDURE — G8598 ASA/ANTIPLAT THER USED: HCPCS | Performed by: INTERNAL MEDICINE

## 2019-05-01 PROCEDURE — 1090F PRES/ABSN URINE INCON ASSESS: CPT | Performed by: INTERNAL MEDICINE

## 2019-05-01 PROCEDURE — 1123F ACP DISCUSS/DSCN MKR DOCD: CPT | Performed by: INTERNAL MEDICINE

## 2019-05-01 PROCEDURE — 1036F TOBACCO NON-USER: CPT | Performed by: INTERNAL MEDICINE

## 2019-05-01 PROCEDURE — 99214 OFFICE O/P EST MOD 30 MIN: CPT | Performed by: INTERNAL MEDICINE

## 2019-05-01 PROCEDURE — 4040F PNEUMOC VAC/ADMIN/RCVD: CPT | Performed by: INTERNAL MEDICINE

## 2019-05-01 NOTE — LETTER
[address] [address] [address] [address]  Phone: [phone]  Fax: [phone]    Daniel Mcghee MD        May 1, 2019     [address] [place] [address]    Patient: Ani Brown  MR Number: [id number]  YOB: 1930  Date of Visit: 5/1/2019    Dear Dr. Wilson Lu:      [address]     H+P // CONSULT // OUTPATIENT VISIT // Huey Cruz     Referring Doctor Wilson Lu MD   Encounter Type Followup     CHIEF COMPLAINT     Visit Type Chronic   Symptoms None   Problems CAD, CHOL, Palpitations     HISTORY OF PRESENT ILLNESS     ? Doing well. Feels better off coreg. Confused about current medications. No new symptoms. Unclear if taking meds as prescribed. ? Denies cp, sob, dizziness, syncope, palpitations. ? Compliant with CV meds listed below without notable side effects. COMPLIANCE     Category Meds Diet Salt Exercise Tobacco Alcohol Drugs   Compliant [x] [x] [x] [x] [x] [x] [x]   [x]Counseling given on all above above categories    HISTORY/ALLERGIES/ROS     MedHx:  has a past medical history of CAD (coronary artery disease) and Hyperlipidemia. SurgHx:  has a past surgical history that includes joint replacement and Carpal tunnel release. SocHx:   reports that she has quit smoking. She has never used smokeless tobacco. She reports that she drinks alcohol. She reports that she does not use drugs. FamHx: family history includes Heart Attack in her brother; Heart Disease in her father.    Allergies: Lisinopril   ROS:  [x]Full ROS obtained and negative except as mentioned in HPI     MEDICATIONS      Current Outpatient Medications   Medication Sig Dispense Refill    atorvastatin (LIPITOR) 80 MG tablet TAKE 1 TABLET BY MOUTH EVERY NIGHT 90 tablet 1    clopidogrel (PLAVIX) 75 MG tablet TAKE 1 TABLET BY MOUTH DAILY 90 tablet 1 Plan   Continue aggressive medical treatment at doses above  No BB due to low HR and BP  Stop losartan due to bp   Stop plavix and continue asa 81   ~Hyperchol  Goal LDL <70   Counseling Counseled on diet, exercise and ideal body weight   Plan PCP liver/lipid surveillance   8/18: HDL:73, LDL:58  ~Palpitations  30 day monitor: no significant arrythmias  Plan Observation  ~Compliance  Questionable compliance with memory issues  Plan Encouraged compliance, simplified regimen, printed active meds for patient.  ~Followup  Interval: 6 months  Tests/Labs: None    Scribe Attestation  INicole, am scribing for and in the presence of Howard Gunderson MD.   Signed, Nicole Ruby 04/22/19 2:10 PM   Provider David Rios is working as a scribe for and in the presence of me (Howard Gunderson MD). Working as a scribe, Nicole Ruby may have prepopulated components of this note with my historical  intellectual property under my direct supervision. Any additions to this intellectual property were performed in my presence and at my direction. Furthermore, the content and accuracy of this note have been reviewed by me Howard Gunderson MD).  5/1/2019 9:34 AM          If you have questions, please do not hesitate to call me. I look forward to following Lyssa Red along with you.     Sincerely,        Jose Noonan MD

## 2019-05-02 NOTE — TELEPHONE ENCOUNTER
Pt called back regarding the message I left for her. She was asking if the the calcium that is listed on her med list is something that she should have a prescription for. The pt states that she takes calcium 1200 mg daily OTC. Informed the pt that calcium was OTC. She verbalized understanding.

## 2019-07-19 RX ORDER — ATORVASTATIN CALCIUM 80 MG/1
TABLET, FILM COATED ORAL
Qty: 30 TABLET | Refills: 1 | Status: SHIPPED | OUTPATIENT
Start: 2019-07-19 | End: 2019-09-25 | Stop reason: SDUPTHER

## 2019-09-25 RX ORDER — ATORVASTATIN CALCIUM 80 MG/1
TABLET, FILM COATED ORAL
Qty: 30 TABLET | Refills: 0 | Status: SHIPPED | OUTPATIENT
Start: 2019-09-25 | End: 2019-11-15

## 2019-11-15 ENCOUNTER — OFFICE VISIT (OUTPATIENT)
Dept: CARDIOLOGY CLINIC | Age: 84
End: 2019-11-15
Payer: MEDICARE

## 2019-11-15 VITALS
DIASTOLIC BLOOD PRESSURE: 60 MMHG | WEIGHT: 166 LBS | HEART RATE: 86 BPM | BODY MASS INDEX: 25.16 KG/M2 | OXYGEN SATURATION: 98 % | SYSTOLIC BLOOD PRESSURE: 112 MMHG | HEIGHT: 68 IN

## 2019-11-15 DIAGNOSIS — I25.10 CORONARY ARTERY DISEASE INVOLVING NATIVE CORONARY ARTERY OF NATIVE HEART WITHOUT ANGINA PECTORIS: Primary | ICD-10-CM

## 2019-11-15 DIAGNOSIS — R00.2 PALPITATIONS: ICD-10-CM

## 2019-11-15 DIAGNOSIS — E78.00 HYPERCHOLESTEREMIA: ICD-10-CM

## 2019-11-15 DIAGNOSIS — Z91.199 NONCOMPLIANCE: ICD-10-CM

## 2019-11-15 PROCEDURE — 1036F TOBACCO NON-USER: CPT | Performed by: INTERNAL MEDICINE

## 2019-11-15 PROCEDURE — 1123F ACP DISCUSS/DSCN MKR DOCD: CPT | Performed by: INTERNAL MEDICINE

## 2019-11-15 PROCEDURE — 99213 OFFICE O/P EST LOW 20 MIN: CPT | Performed by: INTERNAL MEDICINE

## 2019-11-15 PROCEDURE — G8417 CALC BMI ABV UP PARAM F/U: HCPCS | Performed by: INTERNAL MEDICINE

## 2019-11-15 PROCEDURE — G8598 ASA/ANTIPLAT THER USED: HCPCS | Performed by: INTERNAL MEDICINE

## 2019-11-15 PROCEDURE — G8427 DOCREV CUR MEDS BY ELIG CLIN: HCPCS | Performed by: INTERNAL MEDICINE

## 2019-11-15 PROCEDURE — 4040F PNEUMOC VAC/ADMIN/RCVD: CPT | Performed by: INTERNAL MEDICINE

## 2019-11-15 PROCEDURE — G8484 FLU IMMUNIZE NO ADMIN: HCPCS | Performed by: INTERNAL MEDICINE

## 2019-11-15 PROCEDURE — 1090F PRES/ABSN URINE INCON ASSESS: CPT | Performed by: INTERNAL MEDICINE

## 2020-03-18 RX ORDER — ATORVASTATIN CALCIUM 80 MG/1
TABLET, FILM COATED ORAL
Qty: 90 TABLET | Refills: 0 | Status: SHIPPED | OUTPATIENT
Start: 2020-03-18 | End: 2020-06-08 | Stop reason: SDUPTHER

## 2020-03-18 NOTE — TELEPHONE ENCOUNTER
Spoke with patient informed her that she needed her lab work drawn for her cholesterol. Informed her that she is not to come get her lab work done till this epidemic is over with.

## 2020-06-08 RX ORDER — ATORVASTATIN CALCIUM 80 MG/1
TABLET, FILM COATED ORAL
Qty: 30 TABLET | Refills: 11 | Status: SHIPPED | OUTPATIENT
Start: 2020-06-08 | End: 2021-06-28

## 2020-06-08 NOTE — TELEPHONE ENCOUNTER
Medication Refill    Medication needing refilled:atorvastatin    Doseage of the medication:    How are you taking this medication (QD, BID, TID, QID, PRN):    30 or 90 day supply called in:    Which Pharmacy are we sending the medication to?: [facility]

## 2020-10-22 ENCOUNTER — TELEPHONE (OUTPATIENT)
Dept: CARDIOLOGY CLINIC | Age: 85
End: 2020-10-22

## 2020-10-22 NOTE — PROGRESS NOTES
[facility]     Outpatient Follow Up Note      CHIEF COMPLAINT / HPI:  Follow Up secondary to CAD, Syncope     Subjective:   Nikki Romero is 80 y.o. female who presents today with a history of CAD, HLD, palpations, CM/EF 35% improved to 50-55%, syncope, and HLD. She has been followed by general cardiology for CAD and complaints of dizziness/syncope. She was also referred to the [facility] but did not follow up. Pt reports she feel recently. She reports she was taking the garbage out Monday AM. She was bending over at the waste putting something in a gargage bag. All of a sudden she fell back against the car in the garage. She slid down the car and landed on her butt. She does not think she hit her head. However she reports she cracked per pelvic bone. She did loose consciousness but she does not think it was for very long. Does not remember any symptoms prior to the fall. Denies loss of bowel or bladder function. The fall was unwitnessed. She did not feel confused when she came to. This episode reminds her when she collapsed in the past in 2017. Periodically she has a pain in her left chest under her breast that radiated to her back. She has had it off and on for about 2-3yrs. She does not think it worsens with exertion. Yesterday pain in her chest lasted most of the day where it usually doesn't last near as long. This was worrisome to her. She does not recall any associated symptoms. No pain when pushing on chest wall. She admits to palpations described as \"fibrillation or flutter\". It comes and goes usually lasts about 1/2 -1hr. She lays down and it ends up eventually going away. She thinks maybe these symptoms are a little worse and are different compared to when she had her last event monitor on in 2017. No associated symptoms. She currently lives at [facility] in independent living. She does exercise classes (moving her arms and legs while seated) at her facility.  She does not recall any chest discomfort during those classes. No c/o SOB. She get dizzy when she looks up or bends over at her waste. This is not new. Has lost about 12lbs in the last 6 months. Denies swelling, orthopnea, or PND. With regard to medication therapy the patient has been compliant with prescribed regimen. They have tolerated therapy to date. Past Medical History:   Diagnosis Date    CAD (coronary artery disease)     Hyperlipidemia      Social History:    Social History     Tobacco Use   Smoking Status Former Smoker   Smokeless Tobacco Never Used     Current Medications:  Current Outpatient Medications   Medication Sig Dispense Refill    atorvastatin (LIPITOR) 80 MG tablet TAKE 1 TABLET BY MOUTH EVERY NIGHT 30 tablet 11    calcium carbonate (OSCAL) 500 MG TABS tablet Take 500 mg by mouth daily       Soya Lecithin 1200 MG CAPS Take by mouth daily      Omega-3 Fatty Acids (OMEGA-3 EPA FISH OIL PO) Take 1,000 mg by mouth daily      Misc Natural Products (OSTEO BI-FLEX ADV TRIPLE ST PO) Take 2 tablets by mouth daily      vitamin C (ASCORBIC ACID) 500 MG tablet Take 1,000 mg by mouth daily       Glucosamine-Chondroit-Vit C-Mn (GLUCOSAMINE CHONDR 500 COMPLEX PO) Take 500 mg by mouth 2 times daily      aspirin 81 MG chewable tablet Take 1 tablet by mouth daily 30 tablet 3    vitamin D (CHOLECALCIFEROL) 1000 UNIT TABS tablet Take 1,000 Units by mouth daily      Coenzyme Q10 (COQ10 PO) Take by mouth       No current facility-administered medications for this visit. REVIEW OF SYSTEMS:    CONSTITUTIONAL: No major weight gain or loss, night sweats, fever, fatigue, or weakness. HEENT: No new vision difficulties or ringing in the ears. RESPIRATORY: No new SOB, PND, orthopnea or cough. CARDIOVASCULAR: See HPI  GI: No N/V/D, constipation, or abdominal pain. No black/tarry stools  : No urinary urgency, incontinence, or hematuria. SKIN: No cyanosis or skin lesions.   MUSCULOSKELETAL: No new muscle or joint pain. +hip pain   NEUROLOGICAL: No  TIA-like symptoms. + syncope   PSYCHIATRIC: No anxiety, pain, insomnia or depression    Objective:   PHYSICAL EXAM:        Vitals:    10/23/20 1009 10/23/20 1020 10/23/20 1025   BP: 126/70 130/70 122/70   Site: Left Upper Arm     Position: Sitting Sitting Standing   Cuff Size: Medium Adult     Pulse: 62     SpO2: 98%     Weight: 154 lb 12.8 oz (70.2 kg)     Height: 5' 7\" (1.702 m)        VITALS:  /70 (Position: Standing)   Pulse 62   Ht 5' 7\" (1.702 m)   Wt 154 lb 12.8 oz (70.2 kg)   SpO2 98%   BMI 24.25 kg/m²   CONSTITUTIONAL: Cooperative, no apparent distress, and appears well nourished / developed  NEUROLOGIC:  Awake and orientated to person, place, and time. PSYCH: Calm affect. SKIN: Warm and dry. HEENT: Sclera non-icteric, normocephalic, neck supple. RESPIRATORY:  No increased work of breathing and clear to auscultation, no crackles or wheezing. CARDIOVASCULAR:  Regular rate and rhythm without murmur. Normal S1 and S2. No gallops or rubs. Normal PMI. No elevation of JVP. Normal carotid pulses with no bruits. GI:  Normal bowel sounds. Non-distended. Non-tender to palpation  EXT: No edema. No calf tenderness. Pulses are present bilaterally.     DATA:    Lab Results   Component Value Date    ALT 14 12/22/2017    AST 24 12/22/2017    ALKPHOS 79 12/22/2017    BILITOT 0.4 12/22/2017     Lab Results   Component Value Date    CREATININE <0.5 (L) 12/23/2017    BUN 11 12/23/2017     (L) 12/23/2017    K 3.9 12/23/2017     12/23/2017    CO2 21 12/23/2017     No results found for: TSH, A4FNJBB, Q0QOADL, THYROIDAB  Lab Results   Component Value Date    WBC 6.9 12/23/2017    HGB 13.3 12/23/2017    HCT 39.4 12/23/2017    MCV 88.3 12/23/2017     12/23/2017     No components found for: CHLPL  Lab Results   Component Value Date    TRIG 71 12/23/2017     Lab Results   Component Value Date    HDL 76 (H) 12/23/2017     Lab Results Component Value Date    LDLCALC 101 (H) 12/23/2017     Lab Results   Component Value Date    LABVLDL 14 12/23/2017      No results found for: BNP     Labs from PCP 10/14/20   WBC 5.2, Hgb 14.2, Hct 42.9, Chol 131, HDL 69, trig 38, LDL 51, A1C 6.3, Na 137, K 4.8, BUN 20, Creatinine 0.79, ALT 19, AST 25, TSH 1.510    Radiology Review:  Pertinent images / reports were reviewed as a part of this visit and reveals the following:    Last Echo: 12/23/2017  Summar   Normal left ventricle size and systolic function with an estimated   ejection fraction of 50-55%. Mild inferoapical hypokinesis   There is reversal of E/A inflow velocities across the mitral valve. There is mild concentric left ventricular hypertrophy. Trivial mitral regurgitation is present. There is mild tricuspid regurgitation with RVSP estimated at 32 mmHg. Last Angiogram: 12/22/17  Coronary Findings  LM       Normal   LAD     40% mid  Cx        50% proximal, 80% ostial OM2  RCA     ectatic  LVG     35%, distal 50% akinetic, hyperdynamic basal segments  EDP     6mmHg   Coronary Intervention  PCI OM2 with [id number] to 14atm   Recommendation  Continued medical treatment    30 day Event monitor: 1/3 - 2/1/18: no significant arrhythmias noted              ~avg HR 76 min 54 max 120               ~PVC burden 1%              ~9 bts PSVT (asymptomatic)              ~symptoms: c/o light headedness in sinus rhythm    Last ECG: 10/23/20  Sinus  Rhythm  -With rate variation   cv = 13.  -RSR(V1) -incomplete right bundle branch block and anterior fascicular block.    -Combined atrial enlargement.    -Old anterior infarct. Assessment:     1. Syncope   ~ recent episode   ~ Holter: April '08 (done for c/o irregular HB): sinus rhythm, avg HR 88 min 63 max 113; supraventricular ectopy < 1%; ventricular ectopy 4%   ~ Event monitor: sinus rhythm, PVC burden 1%; 9 bts PSVT asymptomatic  ~ orthostatic BP negative in office      2.  Coronary artery disease   ~ stable; c/o atypical episodes   ~ NSTEMI s/p [facility] 12/2017: PCI of OM   ~ ASA/ statin   ~ no BB d/t low BP/HR    3. Hyperlipidemia   ~ Controlled ; LDL 51 (10/2020), LFTs WNL  ~ lipitor 80    4. Palpations   ~ persistent and potentially more frequent   ~ 30 day event monitor 1/2018: no significant arrhythmia   ~ Recent electrolytes and TSH ok     I had the opportunity to review the clinical symptoms and presentation of Cayla Valera. Plan:     1. Stress test with chest symptoms    30 Event monitor, Echo, and carotid US with recent syncope   2. Follow up after testing    Call with any concerns     Overall the patient is stable from CV standpoint    I have addresed the patient's cardiac risk factors and adjusted pharmacologic treatment as needed. In addition, I have reinforced the need for patient directed risk factor modification. Further evaluation will be based upon the patient's clinical course and testing results. All questions and concerns were addressed to the patient. Alternatives to my treatment were discussed. The patient verbalizes understanding not to stop medications without discussing with us. The patient is not currently smoking. The risks related to smoking were reviewed with the patient. Recommend maintaining a smoke-free lifestyle. Patient is not on a beta-blocker ; d/t low BP/HR  Patient is not on an ACE / ARB ; neg CHF   Patient is on a statin    Antiplatelet therapy has been recommended / prescribed for this patient. Education conducted on adverse reactions including bleeding were discussed. Discussed exercise: 30min of sustained cardiovascular exercise most days of the week   Discussed Low saturated fat / Cholesterol diet. Thank you for allowing us to participate in the care of Cayla Valera.     Sigifredo Sol APRN-ORALIA  [address]   Office: [phone]    Documentation of today's visit sent to PCP

## 2020-10-22 NOTE — TELEPHONE ENCOUNTER
Pt calling she fell Monday and her PCP asked her to call [facility] about it to see if this was something urgent going on. pls call to advise thank you

## 2020-10-22 NOTE — TELEPHONE ENCOUNTER
Spoke with patient and she would like to be seen soon because she feels like she passed out when she fell. Patient scheduled with Alyssa Nick NP tomorrow 10/23/20. Appt confirmed.

## 2020-10-23 ENCOUNTER — OFFICE VISIT (OUTPATIENT)
Dept: CARDIOLOGY CLINIC | Age: 85
End: 2020-10-23
Payer: MEDICARE

## 2020-10-23 PROCEDURE — 99214 OFFICE O/P EST MOD 30 MIN: CPT | Performed by: NURSE PRACTITIONER

## 2020-10-23 PROCEDURE — 93000 ELECTROCARDIOGRAM COMPLETE: CPT | Performed by: NURSE PRACTITIONER

## 2020-10-23 NOTE — PATIENT INSTRUCTIONS
Schedule stress test (looking at coronary artery perfusion, make sure there are no new blockages),  echocardiogram (looking at heart pump, heart wall motion, and valves), and  carotid ultrasound.      30 day event monitor     Follow up after testing   Call with ANY concerns

## 2020-10-25 VITALS
HEART RATE: 62 BPM | DIASTOLIC BLOOD PRESSURE: 70 MMHG | OXYGEN SATURATION: 98 % | SYSTOLIC BLOOD PRESSURE: 122 MMHG | HEIGHT: 67 IN | WEIGHT: 154.8 LBS | BODY MASS INDEX: 24.3 KG/M2

## 2020-11-03 PROBLEM — R55 SYNCOPE AND COLLAPSE: Status: RESOLVED | Noted: 2020-11-03 | Resolved: 2020-11-03

## 2020-11-12 ENCOUNTER — TELEPHONE (OUTPATIENT)
Dept: CARDIOLOGY CLINIC | Age: 85
End: 2020-11-12

## 2020-11-12 NOTE — LETTER
[address]  Preoperative Risk Assessment    Felicita Streeter  1/24/1930    Procedure Details  Procedure: Cataract  Anesthesia: Local  Date:  11/23/20, 12/11/20    Patient Antiplatelet/Anticoagulants  Antiplatelet(s): ASA  Indication: CAD    Medication Recommendations  ? ASA    Continue perioperatively unless contraindicated    CV Risk Assessment  ? Low - may proceed with procedure  ?  Restart antiplatelets/anticoagulants as soon as possible and when safe from surgical standpoint        ____________________ ___/___/___  Reford MD Yenifer  Date  \[facility] [place] [facility]\"" 81  [phone]

## 2020-11-12 NOTE — TELEPHONE ENCOUNTER
CARDIAC CLEARANCE     What type of procedure are you having? Cataract Surgery - both eyes    Which physician is performing your procedure? When is your procedure scheduled for? 11-23-20 & 12-11-20    Where are you having this procedure? [facility]    Are you taking Blood Thinners? If so what? (Name/dose/frequesncy)     Does the surgeon want you to stop your blood thinner? If so for how long?     Phone Number and Contact Name for Physicians office: [phone]    Fax number to send information: [phone]

## 2020-11-16 ENCOUNTER — TELEPHONE (OUTPATIENT)
Dept: CARDIOLOGY CLINIC | Age: 85
End: 2020-11-16

## 2020-11-16 NOTE — TELEPHONE ENCOUNTER
Please advise - would you like a complete 30 days or did you want me to have her call the 1-800 number for them to walk her through wearing the necklace to complete the monitoring? Would you like to see the report first to determine?

## 2020-11-16 NOTE — TELEPHONE ENCOUNTER
Pt calling she has been wearing a 30 day monitor since 10/23 and she was going to change the pads today and she noticed she has a rash starting around those areas, Pt wants to know if she still has to wear it for the full 30 days or can she stop now wearing it and use the info from last 3 wks?  Pls call to advise Thank you

## 2020-11-16 NOTE — TELEPHONE ENCOUNTER
Spoke to patient and patient stated she will try and continue but if it starts to bother her she will just stop and return it

## 2020-11-16 NOTE — TELEPHONE ENCOUNTER
Ideally I would like monitor for full 30 days. If rash is severe / not tolerable then she can stop monitor.

## 2020-11-24 ENCOUNTER — TELEPHONE (OUTPATIENT)
Dept: CARDIOLOGY CLINIC | Age: 85
End: 2020-11-24

## 2020-11-24 PROCEDURE — 93228 REMOTE 30 DAY ECG REV/REPORT: CPT | Performed by: INTERNAL MEDICINE

## 2020-11-24 NOTE — TELEPHONE ENCOUNTER
Pt is having trouble getting a ride for 12/8/20  Testing and 12/9/20 appt with NPKL. Pt asking how important it is to get this done or can it be postponed. Please call to advise.

## 2020-11-25 NOTE — TELEPHONE ENCOUNTER
Spoke to pt. Her symptoms are stable. Ok to move tests and appt out. Please call pt and move tests and appointment.

## 2020-11-25 NOTE — TELEPHONE ENCOUNTER
Called pt to get appt changed to virtual and she wants to know if she really needs to come in for the test? Does she really need to have them done? Told pt to keep the appts but pt wants to talk to [facility]. Please advise.

## 2021-01-04 ENCOUNTER — TELEPHONE (OUTPATIENT)
Dept: CARDIOLOGY CLINIC | Age: 86
End: 2021-01-04

## 2021-01-12 ENCOUNTER — OFFICE VISIT (OUTPATIENT)
Dept: CARDIOLOGY CLINIC | Age: 86
End: 2021-01-12
Payer: MEDICARE

## 2021-01-12 ENCOUNTER — HOSPITAL ENCOUNTER (OUTPATIENT)
Dept: NON INVASIVE DIAGNOSTICS | Age: 86
Discharge: HOME | End: 2021-01-12
Payer: MEDICARE

## 2021-01-12 DIAGNOSIS — I25.10 CORONARY ARTERY DISEASE INVOLVING NATIVE CORONARY ARTERY OF NATIVE HEART WITHOUT ANGINA PECTORIS: ICD-10-CM

## 2021-01-12 DIAGNOSIS — R55 SYNCOPE, UNSPECIFIED SYNCOPE TYPE: Primary | ICD-10-CM

## 2021-01-12 DIAGNOSIS — R00.2 PALPITATIONS: ICD-10-CM

## 2021-01-12 DIAGNOSIS — E78.00 HYPERCHOLESTEREMIA: ICD-10-CM

## 2021-01-12 DIAGNOSIS — R55 SYNCOPE, UNSPECIFIED SYNCOPE TYPE: ICD-10-CM

## 2021-01-12 LAB
LV EF: 58 %
LVEF MODALITY: NORMAL

## 2021-01-12 PROCEDURE — A9502 TC99M TETROFOSMIN: HCPCS | Performed by: INTERNAL MEDICINE

## 2021-01-12 PROCEDURE — 3430000000 HC RX DIAGNOSTIC RADIOPHARMACEUTICAL: Performed by: INTERNAL MEDICINE

## 2021-01-12 PROCEDURE — 99214 OFFICE O/P EST MOD 30 MIN: CPT | Performed by: NURSE PRACTITIONER

## 2021-01-12 PROCEDURE — 78452 HT MUSCLE IMAGE SPECT MULT: CPT | Performed by: INTERNAL MEDICINE

## 2021-01-12 PROCEDURE — G8427 DOCREV CUR MEDS BY ELIG CLIN: HCPCS | Performed by: NURSE PRACTITIONER

## 2021-01-12 PROCEDURE — 1123F ACP DISCUSS/DSCN MKR DOCD: CPT | Performed by: NURSE PRACTITIONER

## 2021-01-12 PROCEDURE — G8484 FLU IMMUNIZE NO ADMIN: HCPCS | Performed by: NURSE PRACTITIONER

## 2021-01-12 PROCEDURE — 6360000002 HC RX W HCPCS: Performed by: INTERNAL MEDICINE

## 2021-01-12 PROCEDURE — 1090F PRES/ABSN URINE INCON ASSESS: CPT | Performed by: NURSE PRACTITIONER

## 2021-01-12 PROCEDURE — 4040F PNEUMOC VAC/ADMIN/RCVD: CPT | Performed by: NURSE PRACTITIONER

## 2021-01-12 PROCEDURE — G8420 CALC BMI NORM PARAMETERS: HCPCS | Performed by: NURSE PRACTITIONER

## 2021-01-12 PROCEDURE — 93017 CV STRESS TEST TRACING ONLY: CPT | Performed by: INTERNAL MEDICINE

## 2021-01-12 PROCEDURE — 1036F TOBACCO NON-USER: CPT | Performed by: NURSE PRACTITIONER

## 2021-01-12 RX ORDER — AMINOPHYLLINE DIHYDRATE 25 MG/ML
100 INJECTION, SOLUTION INTRAVENOUS ONCE
Status: COMPLETED | OUTPATIENT
Start: 2021-01-12 | End: 2021-01-12

## 2021-01-12 RX ADMIN — REGADENOSON 0.4 MG: 0.08 INJECTION, SOLUTION INTRAVENOUS at 09:23

## 2021-01-12 RX ADMIN — TETROFOSMIN 30 MILLICURIE: 1.38 INJECTION, POWDER, LYOPHILIZED, FOR SOLUTION INTRAVENOUS at 09:28

## 2021-01-12 RX ADMIN — AMINOPHYLLINE 100 MG: 25 INJECTION, SOLUTION INTRAVENOUS at 09:37

## 2021-01-12 RX ADMIN — TETROFOSMIN 10 MILLICURIE: 1.38 INJECTION, POWDER, LYOPHILIZED, FOR SOLUTION INTRAVENOUS at 08:35

## 2021-01-12 NOTE — PROGRESS NOTES
Instructed on Lexiscan Stress Test Procedure including possible side effects/ adverse reactions. Patient verbalizes  understanding and denies having any questions. See [facility] Cardiology.   Dwayne Odell RN

## 2021-01-12 NOTE — PROGRESS NOTES
[address]     Outpatient Follow Up Note      CHIEF COMPLAINT / HPI:  Follow Up secondary to CAD, Syncope     Subjective:   Clint Delgado is 80 y.o. female who presents today with a history of CAD, HLD, palpations, CM/EF 35% improved to 50-55%, syncope, and HLD. She has been followed by general cardiology for CAD and complaints of dizziness/syncope. She was also referred to the [facility] but did not follow up. Today, pt reports feeling better since last OV. Pt reports feeling light headed periodically not in relation to position change. She wonders if it is nerves as it correlates to when she is worked up. She has been stressed lately as she is not allowed to do anything or leave [facility] d/t the pandemic. The discomfort described during last OV under her left breast radiating to her back has improved and has not bothered her in the last couple weeks. Denies SOB/ ZIMMERMAN. Not doing as much routine exercise or walking since she broke her pelvis in Oct. She tries to do 10-15min walks when it is nice outside. Her palpations have also improved and does not notice them much recently. Her wt is stable. Some chronic swelling in Left foot that she reports is from arthritis, wears a brace daily. Denies orthopnea/PND. With regard to medication therapy the patient has been compliant with prescribed regimen. They have tolerated therapy to date.      Past Medical History:   Diagnosis Date    CAD (coronary artery disease)     Hyperlipidemia      Social History:    Social History     Tobacco Use   Smoking Status Former Smoker   Smokeless Tobacco Never Used     Current Medications:  Current Outpatient Medications   Medication Sig Dispense Refill    atorvastatin (LIPITOR) 80 MG tablet TAKE 1 TABLET BY MOUTH EVERY NIGHT 30 tablet 11    calcium carbonate (OSCAL) 500 MG TABS tablet Take 500 mg by mouth daily       Soya Lecithin 1200 MG CAPS Take by mouth daily      Misc Natural Products (OSTEO BI-FLEX ADV TRIPLE ST PO) Take 2 tablets by mouth daily      vitamin C (ASCORBIC ACID) 500 MG tablet Take 1,000 mg by mouth daily       Glucosamine-Chondroit-Vit C-Mn (GLUCOSAMINE CHONDR 500 COMPLEX PO) Take 500 mg by mouth daily       aspirin 81 MG chewable tablet Take 1 tablet by mouth daily 30 tablet 3    vitamin D (CHOLECALCIFEROL) 1000 UNIT TABS tablet Take 1,000 Units by mouth daily      Coenzyme Q10 (COQ10 PO) Take by mouth       No current facility-administered medications for this visit. REVIEW OF SYSTEMS:    CONSTITUTIONAL: No major weight gain or loss, night sweats, fever, fatigue, or weakness. HEENT: No new vision difficulties or ringing in the ears. RESPIRATORY: No new SOB, PND, orthopnea or cough. CARDIOVASCULAR: See HPI  GI: No N/V/D, constipation, or abdominal pain. No black/tarry stools  : No urinary urgency, incontinence, or hematuria. SKIN: No cyanosis or skin lesions. MUSCULOSKELETAL: No new muscle or joint pain. +hip pain   NEUROLOGICAL: No  TIA-like symptoms. + syncope   PSYCHIATRIC: No anxiety, pain, insomnia or depression    Objective:   PHYSICAL EXAM:       Vitals:    01/12/21 1047 01/12/21 1058   BP: 130/74 (!) 140/80   Site: Right Upper Arm    Position: Sitting    Cuff Size: Medium Adult    Pulse: 83    SpO2: 97%    Weight: 154 lb (69.9 kg)    Height: 5' 8\" (1.727 m)        VITALS:  BP (!) 140/80   Pulse 83   Ht 5' 8\" (1.727 m)   Wt 154 lb (69.9 kg)   SpO2 97%   BMI 23.42 kg/m²   CONSTITUTIONAL: Cooperative, no apparent distress, and appears well nourished / developed  NEUROLOGIC:  Awake and orientated to person, place, and time. PSYCH: Calm affect. SKIN: Warm and dry. HEENT: Sclera non-icteric, normocephalic, neck supple. RESPIRATORY:  No increased work of breathing and clear to auscultation, no crackles or wheezing. CARDIOVASCULAR:  Regular rate and rhythm without murmur. Normal S1 and S2. No gallops or rubs. Normal PMI. No elevation of JVP.  Normal carotid pulses with no bruits. GI:  Normal bowel sounds. Non-distended. Non-tender to palpation  EXT: No edema. No calf tenderness. Pulses are present bilaterally. DATA:    Lab Results   Component Value Date    ALT 14 12/22/2017    AST 24 12/22/2017    ALKPHOS 79 12/22/2017    BILITOT 0.4 12/22/2017     Lab Results   Component Value Date    CREATININE <0.5 (L) 12/23/2017    BUN 11 12/23/2017     (L) 12/23/2017    K 3.9 12/23/2017     12/23/2017    CO2 21 12/23/2017     No results found for: TSH, B9LECSU, W3VXSPA, THYROIDAB  Lab Results   Component Value Date    WBC 6.9 12/23/2017    HGB 13.3 12/23/2017    HCT 39.4 12/23/2017    MCV 88.3 12/23/2017     12/23/2017     No components found for: CHLPL  Lab Results   Component Value Date    TRIG 71 12/23/2017     Lab Results   Component Value Date    HDL 76 (H) 12/23/2017     Lab Results   Component Value Date    LDLCALC 101 (H) 12/23/2017     Lab Results   Component Value Date    LABVLDL 14 12/23/2017      No results found for: BNP     Labs from PCP 10/14/20   WBC 5.2, Hgb 14.2, Hct 42.9, Chol 131, HDL 69, trig 38, LDL 51, A1C 6.3, Na 137, K 4.8, BUN 20, Creatinine 0.79, ALT 19, AST 25, TSH 1.510    Radiology Review:  Pertinent images / reports were reviewed as a part of this visit and reveals the following:    Last Echo: 12/23/2017  Summar   Normal left ventricle size and systolic function with an estimated   ejection fraction of 50-55%. Mild inferoapical hypokinesis   There is reversal of E/A inflow velocities across the mitral valve. There is mild concentric left ventricular hypertrophy. Trivial mitral regurgitation is present. There is mild tricuspid regurgitation with RVSP estimated at 32 mmHg.     Last Angiogram: 12/22/17  Coronary Findings  LM       Normal   LAD     40% mid  Cx        50% proximal, 80% ostial OM2  RCA     ectatic  LVG     35%, distal 50% akinetic, hyperdynamic basal segments  EDP     6mmHg   Coronary Intervention  PCI OM2 with [id number] to 14atm   Recommendation  Continued medical treatment    30 day Event monitor: 1/3 - 2/1/18: no significant arrhythmias noted              ~avg HR 76 min 54 max 120               ~PVC burden 1%              ~9 bts PSVT (asymptomatic)              ~symptoms: c/o light headedness in sinus rhythm    Event monitor: 10-11/2020  Sinus rhythm. 1% PVC and PAC burden. 2 runs of NSVT, fastest 158 bpm longest 5 beats, asymptomatic. Chest pain associated with SR. Low HR 50, Avg 77, high 158. SPECT: 1/12/2021  Summary    Normal LV size and systolic function.    Left ventricular ejection fraction of 58%.    There is normal isotope uptake at stress and rest.    There is no evidence of myocardial ischemia or scar     Assessment:     1. Syncope   ~ no further episodes   ~ Holter: April '08 (done for c/o irregular HB): sinus rhythm, avg HR 88 min 63 max 113; supraventricular ectopy < 1%; ventricular ectopy 4%   ~ Event monitor 1/2018: sinus rhythm, PVC burden 1%; 9 bts PSVT asymptomatic  ~ Event monitor 10/2020: Sinus rhythm. 1% PVC and PAC burden. 2 runs of NSVT, fastest 158 bpm longest 5 beats, asymptomatic. Chest pain associated with SR.    2. Coronary artery disease   ~ stable; no c/o angina    ~ NSTEMI s/p [facility] 12/2017: PCI of OM   ~ SPECT 1/2021: no evidence of myocardial ischemia or scar. EF 58%  ~ ASA/ statin   ~ no BB d/t \"low BP/HR\" in past    3. Hyperlipidemia   ~ Controlled ; LDL 51 (10/2020), LFTs WNL  ~ lipitor 80    4. Palpations   ~ stable ; no longer bothersome to her  ~ 30 day event monitor 1/2018: no significant arrhythmia   ~ Event monitor 10/2020: Sinus rhythm. 1% PVC and PAC burden. 2 runs of NSVT, fastest 158 bpm longest 5 beats, asymptomatic. Chest pain associated with SR. I had the opportunity to review the clinical symptoms and presentation of Danisha Aldana. Plan:     1. Continue current medications    Consider addition of low dose BB with 2 runs of NSVT on event monitor.  Pt is hesitant to add and would like to hold off at this time. 2. Monitor home BP and pulse  3. Follow up with Dr. David Mendoza in 6 months    Call prior with any concerns or worsening symptoms     Overall the patient is stable from CV standpoint    I have addresed the patient's cardiac risk factors and adjusted pharmacologic treatment as needed. In addition, I have reinforced the need for patient directed risk factor modification. Further evaluation will be based upon the patient's clinical course and testing results. All questions and concerns were addressed to the patient. Alternatives to my treatment were discussed. The patient verbalizes understanding not to stop medications without discussing with us. The patient is not currently smoking. The risks related to smoking were reviewed with the patient. Recommend maintaining a smoke-free lifestyle. Patient is not on a beta-blocker ; d/t \"low\" BP/HR  Patient is not on an ACE / ARB ; neg CHF   Patient is on a statin    Antiplatelet therapy has been recommended / prescribed for this patient. Education conducted on adverse reactions including bleeding were discussed. Discussed exercise: 30min of sustained cardiovascular exercise most days of the week   Discussed Low saturated fat / Cholesterol diet. Thank you for allowing us to participate in the care of Abril Harding.     Flora TAPIA-ORALIA  [address]   Office: [phone]    Documentation of today's visit sent to PCP

## 2021-01-13 VITALS
DIASTOLIC BLOOD PRESSURE: 80 MMHG | SYSTOLIC BLOOD PRESSURE: 140 MMHG | BODY MASS INDEX: 23.34 KG/M2 | HEIGHT: 68 IN | OXYGEN SATURATION: 97 % | WEIGHT: 154 LBS | HEART RATE: 83 BPM

## 2021-06-28 RX ORDER — ATORVASTATIN CALCIUM 80 MG/1
TABLET, FILM COATED ORAL
Qty: 30 TABLET | Refills: 11 | Status: SHIPPED | OUTPATIENT
Start: 2021-06-28 | End: 2022-06-29 | Stop reason: SDUPTHER

## 2021-06-28 NOTE — TELEPHONE ENCOUNTER
Received refill request for atorvastatin from [facility] pharmacy.     Last ov:1/12/2021 LEONOR    Last labs:lipid panel scanned 10/27/2020    Last Refill: 6/8/2020 #30 with 11 refills    Next appointment:7/29/2021 DCE

## 2021-07-13 PROBLEM — I25.83 CORONARY ARTERY DISEASE DUE TO LIPID RICH PLAQUE: Status: ACTIVE | Noted: 2017-12-23

## 2021-07-13 NOTE — PROGRESS NOTES
[address]     H+P // CONSULT // OUTPATIENT VISIT // [facility] VISIT     Referring Doctor Lc Lainez MD   Encounter Type Followup     CHIEF COMPLAINT     Visit Type Chronic   Symptoms None   Problems CAD, CHOL, Palpitations     HISTORY OF PRESENT ILLNESS      GEN - Doing well. No new cardiac concerns.  CAD - Denies cp, sob, dizziness, syncope, palpitations.  CHOL - Last cholesterol reviewed and in good range. Tolerating statin without side effects.  Palpitations - resolved. 30d monitor.  MED - Compliant with CV meds listed below without notable side effects. HISTORY/ALLERGIES/ROS     MedHx:  has a past medical history of CAD (coronary artery disease) and Hyperlipidemia. SurgHx:  has a past surgical history that includes joint replacement and Carpal tunnel release. SocHx:  reports that she has quit smoking. She has never used smokeless tobacco. She reports current alcohol use. She reports that she does not use drugs. FamHx: family history includes Heart Attack in her brother; Heart Disease in her father.    Allerg: Lisinopril   ROS: [x]Full ROS obtained and negative except as mentioned in HPI     MEDICATIONS      Current Outpatient Medications   Medication Sig Dispense Refill    atorvastatin (LIPITOR) 80 MG tablet TAKE 1 TABLET BY MOUTH EVERY NIGHT 30 tablet 11    calcium carbonate (OSCAL) 500 MG TABS tablet Take 500 mg by mouth daily       Soya Lecithin 1200 MG CAPS Take by mouth daily      Misc Natural Products (OSTEO BI-FLEX ADV TRIPLE ST PO) Take 2 tablets by mouth daily      vitamin C (ASCORBIC ACID) 500 MG tablet Take 1,000 mg by mouth daily       Glucosamine-Chondroit-Vit C-Mn (GLUCOSAMINE CHONDR 500 COMPLEX PO) Take 500 mg by mouth daily       aspirin 81 MG chewable tablet Take 1 tablet by mouth daily 30 tablet 3    vitamin D (CHOLECALCIFEROL) 1000 UNIT TABS tablet Take 1,000 Units by mouth daily      Coenzyme Q10 (COQ10 PO) Take by mouth       No current facility-administered medications for this visit. Reviewed with patient and will remain unchanged except as mentioned in A/P  PHYSICAL EXAM     Vitals:    07/29/21 1341   BP: 120/62   Pulse: 88   SpO2: 95%      Gen Alert, coop, no distress Heart  Rrr, no mrg   Head NC, AT, no abnorm Abd  Soft, NT, +BS, no mass, no OM   Eyes PER, conj/corn clear Ext  Ext nl, AT, no C/C/E   Nose Nares nl, no drain, NT Pulse 2+ and symmetric   Throat Lips, mucosa, tongue nl Skin Col/text/turg nl, no vis rash/les   Neck S/S, TM, NT, no bruit/JVD Psych Nl mood and affect   Lung CTA-B, unlabored, no DTP Lymph   No cervical or axillary LA   Ch wall NT, no deform Neuro  Nl gross M/S exam     ASSESSMENT AND PLAN     *CAD   Date EF Results   Sx   No concerning   Hx   SVPCI   LHC 12/17 35% PCI of OM2 (Emelia)   MPI 1/21 58% Normal perfusion   TTE 12/17 55% Mild inferoapical hypokinesis   Plan   Continue aggressive medical treatment at doses above  No BB due to low HR and BP   *CHOL  Status  controlled with last LDL of 51 (goal <70) and HDL of 69 (10/20), labs reviewed personally  Plan Counseled on diet/exercise/weight, continue high-intensity statin, lipid/liver surveillance per PCP  *Palpitations  30 day monitor: no significant arrythmias  Plan Observation  *COMPLIANCE  Status Compliant  Plan Discussed importance of compliance with meds/diet/salt/exercise; avoid tob/alc/drugs; patient verbalized understanding  *FOLLOWUP  6 months    [address] Carolina Carvajal, am scribing for and in the presence of Mary Duenas MD.   SignedCarolina 04/22/19 2:10 PM   Provider Tanesha Verduzco is working as a scribe for and in the presence of me (Mary Duenas MD). Working as a scribe, Carolina Mcgee may have prepopulated components of this note with my historical  intellectual property under my direct supervision. Any additions to this intellectual property were performed in my presence and at my direction. Furthermore, the content and accuracy of this note have been reviewed by andrews Romero MD).  7/29/2021 12:46 PM  CODING     Category Diagnosis   Stable chronic illness  (70766/08432 - 2 or more) CAD, CHOL, PALPs   Chronic illness with: Exac, progr or SA of Tx  (28623/76204 - 1 or more)    Undiagnosed new problem with: uncertain prognosis  (22754/28571 - 1 or more)    Acute illness with systemic Sx  (84894/20458 - 1 or more)    Acute, complicated injury  (32069/22849 - 1 or more)    53244 1 or more chronic illness with exacerbation, progression or SA of treatment    Time  30-39 minutes spent preparing to see patient including reviewing patient history/prior tests/prior consults, performing a medical exam, counseling and educating patient/family/caregiver, ordering medications/tests/procedures, referring and communicating with PCPs and other pertinent consultants, documenting information in the EMR, independently interpreting results and communicating to family and coordination of patient care.

## 2021-07-29 ENCOUNTER — OFFICE VISIT (OUTPATIENT)
Dept: CARDIOLOGY CLINIC | Age: 86
End: 2021-07-29
Payer: MEDICARE

## 2021-07-29 VITALS
HEART RATE: 88 BPM | OXYGEN SATURATION: 95 % | HEIGHT: 68 IN | BODY MASS INDEX: 23.98 KG/M2 | WEIGHT: 158.2 LBS | DIASTOLIC BLOOD PRESSURE: 62 MMHG | SYSTOLIC BLOOD PRESSURE: 120 MMHG

## 2021-07-29 DIAGNOSIS — I25.10 CORONARY ARTERY DISEASE DUE TO LIPID RICH PLAQUE: Primary | ICD-10-CM

## 2021-07-29 DIAGNOSIS — R00.2 PALPITATIONS: ICD-10-CM

## 2021-07-29 DIAGNOSIS — I25.83 CORONARY ARTERY DISEASE DUE TO LIPID RICH PLAQUE: Primary | ICD-10-CM

## 2021-07-29 DIAGNOSIS — E78.00 HYPERCHOLESTEREMIA: ICD-10-CM

## 2021-07-29 PROCEDURE — 4040F PNEUMOC VAC/ADMIN/RCVD: CPT | Performed by: INTERNAL MEDICINE

## 2021-07-29 PROCEDURE — G8427 DOCREV CUR MEDS BY ELIG CLIN: HCPCS | Performed by: INTERNAL MEDICINE

## 2021-07-29 PROCEDURE — 1123F ACP DISCUSS/DSCN MKR DOCD: CPT | Performed by: INTERNAL MEDICINE

## 2021-07-29 PROCEDURE — 1090F PRES/ABSN URINE INCON ASSESS: CPT | Performed by: INTERNAL MEDICINE

## 2021-07-29 PROCEDURE — G8420 CALC BMI NORM PARAMETERS: HCPCS | Performed by: INTERNAL MEDICINE

## 2021-07-29 PROCEDURE — 1036F TOBACCO NON-USER: CPT | Performed by: INTERNAL MEDICINE

## 2021-07-29 PROCEDURE — 99214 OFFICE O/P EST MOD 30 MIN: CPT | Performed by: INTERNAL MEDICINE

## 2022-06-29 NOTE — TELEPHONE ENCOUNTER
Last OV: 7-29-21 dce  Last labs: 10-14-20 labs no recent  Appt scheduled : none  Last refill:6-28-21 dce

## 2022-06-30 RX ORDER — ATORVASTATIN CALCIUM 80 MG/1
80 TABLET, FILM COATED ORAL DAILY
Qty: 90 TABLET | OUTPATIENT
Start: 2022-06-30

## 2022-06-30 RX ORDER — ATORVASTATIN CALCIUM 80 MG/1
80 TABLET, FILM COATED ORAL DAILY
Qty: 30 TABLET | Refills: 11 | Status: SHIPPED | OUTPATIENT
Start: 2022-06-30

## 2022-06-30 RX ORDER — ATORVASTATIN CALCIUM 80 MG/1
TABLET, FILM COATED ORAL
Qty: 30 TABLET | Refills: 11 | OUTPATIENT
Start: 2022-06-30

## 2022-06-30 NOTE — TELEPHONE ENCOUNTER
Sent to pharmacy as:  Atorvastatin Calcium 80 MG Oral Tablet (LIPITOR)    E-Prescribing Status: Receipt confirmed by pharmacy (6/30/2022  8:40 AM EDT)

## 2023-06-19 ENCOUNTER — TELEPHONE (OUTPATIENT)
Dept: CARDIOLOGY CLINIC | Age: 88
End: 2023-06-19

## 2023-06-19 RX ORDER — ATORVASTATIN CALCIUM 80 MG/1
80 TABLET, FILM COATED ORAL DAILY
Qty: 30 TABLET | Refills: 11 | OUTPATIENT
Start: 2023-06-19

## 2023-06-19 NOTE — TELEPHONE ENCOUNTER
Medication Refill    Medication needing refilled:  atorvastatin (LIPITOR)    Dosage of the medication:  80 MG tablet    How are you taking this medication (QD, BID, TID, QID, PRN):  Take 1 tablet by mouth daily             30 or 90 day supply called in:  30 tablet    When will you run out of your medication:  Currently Out    Which Pharmacy are we sending the medication to?:    [place] [address] [phone] Robi Brooke [phone]   [address] [place] [address] [place]   Phone:  [phone]  Fax:  [phone]

## 2023-06-19 NOTE — TELEPHONE ENCOUNTER
Received refill request for Atorvastatin from [address] [place] [address].     Last ov:07/29/2021 DCE    Last labs:10/14/2020 Lipid    Last Refill:06/30/2022    Next appointment:None

## 2023-06-20 RX ORDER — ATORVASTATIN CALCIUM 80 MG/1
80 TABLET, FILM COATED ORAL DAILY
Qty: 90 TABLET | Refills: 3 | Status: SHIPPED | OUTPATIENT
Start: 2023-06-20

## 2023-09-07 ENCOUNTER — TELEPHONE (OUTPATIENT)
Dept: CARDIOLOGY CLINIC | Age: 88
End: 2023-09-07

## 2023-09-07 NOTE — TELEPHONE ENCOUNTER
Pt was late for her appointment today (took the back way and missed her exit) and was not able to be seen. She needs to reschedule and does not want anywhere except [facility] (is [address] doesn't drive far). Please advise where she can be fit in. Thank you.

## 2023-09-07 NOTE — TELEPHONE ENCOUNTER
LM for pt to call and schedule. Per Concepcion Boyd, the spot mentioned below has been taken. Add her the same day at the beginning of clinic. Will need to add 15 min appt to schedule.

## 2023-09-07 NOTE — TELEPHONE ENCOUNTER
Also LM on cell. DCE had a cancellation today around 1:30 pt can have if she can make it back. Olive He has it on hold for her.

## 2023-09-22 NOTE — PATIENT INSTRUCTIONS
Continue all medications as prescribed    If chest tightness or pressure worsens call and let us know    Dr Everett Haley recommends Tylenol for pain

## 2023-09-25 ENCOUNTER — OFFICE VISIT (OUTPATIENT)
Dept: CARDIOLOGY CLINIC | Age: 88
End: 2023-09-25
Payer: MEDICARE

## 2023-09-25 VITALS
WEIGHT: 153.8 LBS | OXYGEN SATURATION: 99 % | SYSTOLIC BLOOD PRESSURE: 130 MMHG | HEIGHT: 67 IN | HEART RATE: 60 BPM | DIASTOLIC BLOOD PRESSURE: 58 MMHG | BODY MASS INDEX: 24.14 KG/M2

## 2023-09-25 DIAGNOSIS — E78.00 HYPERCHOLESTEROLEMIA: ICD-10-CM

## 2023-09-25 DIAGNOSIS — I25.10 CORONARY ARTERY DISEASE DUE TO LIPID RICH PLAQUE: Primary | ICD-10-CM

## 2023-09-25 DIAGNOSIS — I25.83 CORONARY ARTERY DISEASE DUE TO LIPID RICH PLAQUE: Primary | ICD-10-CM

## 2023-09-25 PROCEDURE — G8420 CALC BMI NORM PARAMETERS: HCPCS | Performed by: INTERNAL MEDICINE

## 2023-09-25 PROCEDURE — 1090F PRES/ABSN URINE INCON ASSESS: CPT | Performed by: INTERNAL MEDICINE

## 2023-09-25 PROCEDURE — 1123F ACP DISCUSS/DSCN MKR DOCD: CPT | Performed by: INTERNAL MEDICINE

## 2023-09-25 PROCEDURE — 99214 OFFICE O/P EST MOD 30 MIN: CPT | Performed by: INTERNAL MEDICINE

## 2023-09-25 PROCEDURE — G8427 DOCREV CUR MEDS BY ELIG CLIN: HCPCS | Performed by: INTERNAL MEDICINE

## 2023-09-25 PROCEDURE — 1036F TOBACCO NON-USER: CPT | Performed by: INTERNAL MEDICINE

## 2023-09-25 RX ORDER — MULTIVIT WITH MINERALS/LUTEIN
1000 TABLET ORAL DAILY
COMMUNITY

## 2023-09-25 RX ORDER — LORAZEPAM 0.5 MG/1
0.5 TABLET ORAL DAILY PRN
COMMUNITY
Start: 2023-09-12

## 2024-04-15 NOTE — TELEPHONE ENCOUNTER
Notified patient that per Anne at her last office visit she needs to start losartan 25 mg daily. done

## 2024-05-06 ENCOUNTER — HOSPITAL ENCOUNTER (OUTPATIENT)
Age: 89
Setting detail: OBSERVATION
LOS: 1 days | Discharge: HOME HEALTH | End: 2024-05-07
Attending: INTERNAL MEDICINE | Admitting: INTERNAL MEDICINE
Payer: MEDICARE

## 2024-05-06 ENCOUNTER — APPOINTMENT (OUTPATIENT)
Dept: CT IMAGING | Age: 89
End: 2024-05-06
Payer: MEDICARE

## 2024-05-06 DIAGNOSIS — K92.1 MELENA: Primary | ICD-10-CM

## 2024-05-06 PROBLEM — K92.2 UPPER GI HEMORRHAGE: Status: ACTIVE | Noted: 2024-05-06

## 2024-05-06 LAB
ABO + RH BLD: NORMAL
ALBUMIN SERPL-MCNC: 4.2 G/DL (ref 3.4–5)
ALBUMIN/GLOB SERPL: 1.4 {RATIO} (ref 1.1–2.2)
ALP SERPL-CCNC: 110 U/L (ref 40–129)
ALT SERPL-CCNC: 15 U/L (ref 10–40)
ANION GAP SERPL CALCULATED.3IONS-SCNC: 11 MMOL/L (ref 3–16)
APTT BLD: 28.3 SEC (ref 22.1–36.4)
AST SERPL-CCNC: 21 U/L (ref 15–37)
BASOPHILS # BLD: 0 K/UL (ref 0–0.2)
BASOPHILS NFR BLD: 0.6 %
BILIRUB SERPL-MCNC: 0.7 MG/DL (ref 0–1)
BLD GP AB SCN SERPL QL: NORMAL
BUN SERPL-MCNC: 18 MG/DL (ref 7–20)
CALCIUM SERPL-MCNC: 9.9 MG/DL (ref 8.3–10.6)
CHLORIDE SERPL-SCNC: 94 MMOL/L (ref 99–110)
CO2 SERPL-SCNC: 25 MMOL/L (ref 21–32)
CREAT SERPL-MCNC: 0.6 MG/DL (ref 0.6–1.2)
DEPRECATED RDW RBC AUTO: 14.8 % (ref 12.4–15.4)
EOSINOPHIL # BLD: 0 K/UL (ref 0–0.6)
EOSINOPHIL NFR BLD: 0.2 %
GFR SERPLBLD CREATININE-BSD FMLA CKD-EPI: 83 ML/MIN/{1.73_M2}
GLUCOSE SERPL-MCNC: 116 MG/DL (ref 70–99)
HCT VFR BLD AUTO: 44 % (ref 36–48)
HEMOCCULT STL QL: NORMAL
HGB BLD-MCNC: 14.5 G/DL (ref 12–16)
INR PPP: 1 (ref 0.85–1.15)
LYMPHOCYTES # BLD: 0.9 K/UL (ref 1–5.1)
LYMPHOCYTES NFR BLD: 15.2 %
MCH RBC QN AUTO: 28.9 PG (ref 26–34)
MCHC RBC AUTO-ENTMCNC: 32.9 G/DL (ref 31–36)
MCV RBC AUTO: 88 FL (ref 80–100)
MONOCYTES # BLD: 0.5 K/UL (ref 0–1.3)
MONOCYTES NFR BLD: 9.1 %
NEUTROPHILS # BLD: 4.4 K/UL (ref 1.7–7.7)
NEUTROPHILS NFR BLD: 74.9 %
PLATELET # BLD AUTO: 161 K/UL (ref 135–450)
PMV BLD AUTO: 8.7 FL (ref 5–10.5)
POTASSIUM SERPL-SCNC: 4.1 MMOL/L (ref 3.5–5.1)
PROT SERPL-MCNC: 7.2 G/DL (ref 6.4–8.2)
PROTHROMBIN TIME: 13.4 SEC (ref 11.9–14.9)
RBC # BLD AUTO: 5 M/UL (ref 4–5.2)
SODIUM SERPL-SCNC: 130 MMOL/L (ref 136–145)
WBC # BLD AUTO: 5.8 K/UL (ref 4–11)

## 2024-05-06 PROCEDURE — 82270 OCCULT BLOOD FECES: CPT

## 2024-05-06 PROCEDURE — 85730 THROMBOPLASTIN TIME PARTIAL: CPT

## 2024-05-06 PROCEDURE — 6360000002 HC RX W HCPCS: Performed by: PHYSICIAN ASSISTANT

## 2024-05-06 PROCEDURE — 96374 THER/PROPH/DIAG INJ IV PUSH: CPT

## 2024-05-06 PROCEDURE — 6370000000 HC RX 637 (ALT 250 FOR IP): Performed by: INTERNAL MEDICINE

## 2024-05-06 PROCEDURE — C9113 INJ PANTOPRAZOLE SODIUM, VIA: HCPCS | Performed by: PHYSICIAN ASSISTANT

## 2024-05-06 PROCEDURE — 74174 CTA ABD&PLVS W/CONTRAST: CPT

## 2024-05-06 PROCEDURE — 6360000004 HC RX CONTRAST MEDICATION: Performed by: PHYSICIAN ASSISTANT

## 2024-05-06 PROCEDURE — 80053 COMPREHEN METABOLIC PANEL: CPT

## 2024-05-06 PROCEDURE — 86850 RBC ANTIBODY SCREEN: CPT

## 2024-05-06 PROCEDURE — 2580000003 HC RX 258: Performed by: INTERNAL MEDICINE

## 2024-05-06 PROCEDURE — C9113 INJ PANTOPRAZOLE SODIUM, VIA: HCPCS | Performed by: INTERNAL MEDICINE

## 2024-05-06 PROCEDURE — 86900 BLOOD TYPING SEROLOGIC ABO: CPT

## 2024-05-06 PROCEDURE — 86901 BLOOD TYPING SEROLOGIC RH(D): CPT

## 2024-05-06 PROCEDURE — 85610 PROTHROMBIN TIME: CPT

## 2024-05-06 PROCEDURE — 85025 COMPLETE CBC W/AUTO DIFF WBC: CPT

## 2024-05-06 PROCEDURE — 99285 EMERGENCY DEPT VISIT HI MDM: CPT

## 2024-05-06 PROCEDURE — 1200000000 HC SEMI PRIVATE

## 2024-05-06 PROCEDURE — 6360000002 HC RX W HCPCS: Performed by: INTERNAL MEDICINE

## 2024-05-06 RX ORDER — CALCIUM CARBONATE 500(1250)
500 TABLET ORAL DAILY
Status: DISCONTINUED | OUTPATIENT
Start: 2024-05-06 | End: 2024-05-06

## 2024-05-06 RX ORDER — ASPIRIN 81 MG/1
81 TABLET, CHEWABLE ORAL DAILY
Status: DISCONTINUED | OUTPATIENT
Start: 2024-05-06 | End: 2024-05-07 | Stop reason: HOSPADM

## 2024-05-06 RX ORDER — LORAZEPAM 0.5 MG/1
0.5 TABLET ORAL DAILY PRN
Status: DISCONTINUED | OUTPATIENT
Start: 2024-05-06 | End: 2024-05-06

## 2024-05-06 RX ORDER — CARBOXYMETHYLCELLULOSE SODIUM 10 MG/ML
1 GEL OPHTHALMIC NIGHTLY
Status: DISCONTINUED | OUTPATIENT
Start: 2024-05-06 | End: 2024-05-07 | Stop reason: HOSPADM

## 2024-05-06 RX ORDER — VITAMIN B COMPLEX
1000 TABLET ORAL DAILY
Status: DISCONTINUED | OUTPATIENT
Start: 2024-05-06 | End: 2024-05-07 | Stop reason: HOSPADM

## 2024-05-06 RX ORDER — SODIUM CHLORIDE, SODIUM LACTATE, POTASSIUM CHLORIDE, CALCIUM CHLORIDE 600; 310; 30; 20 MG/100ML; MG/100ML; MG/100ML; MG/100ML
INJECTION, SOLUTION INTRAVENOUS CONTINUOUS
Status: DISCONTINUED | OUTPATIENT
Start: 2024-05-06 | End: 2024-05-07

## 2024-05-06 RX ORDER — PANTOPRAZOLE SODIUM 40 MG/10ML
40 INJECTION, POWDER, LYOPHILIZED, FOR SOLUTION INTRAVENOUS 2 TIMES DAILY
Status: DISCONTINUED | OUTPATIENT
Start: 2024-05-06 | End: 2024-05-07 | Stop reason: HOSPADM

## 2024-05-06 RX ORDER — LOSARTAN POTASSIUM 25 MG/1
50 TABLET ORAL NIGHTLY
Status: DISCONTINUED | OUTPATIENT
Start: 2024-05-06 | End: 2024-05-07 | Stop reason: HOSPADM

## 2024-05-06 RX ORDER — PANTOPRAZOLE SODIUM 40 MG/10ML
40 INJECTION, POWDER, LYOPHILIZED, FOR SOLUTION INTRAVENOUS ONCE
Status: COMPLETED | OUTPATIENT
Start: 2024-05-06 | End: 2024-05-06

## 2024-05-06 RX ORDER — LOSARTAN POTASSIUM 50 MG/1
1 TABLET ORAL NIGHTLY
COMMUNITY
Start: 2023-09-16

## 2024-05-06 RX ORDER — ATORVASTATIN CALCIUM 80 MG/1
80 TABLET, FILM COATED ORAL NIGHTLY
Status: DISCONTINUED | OUTPATIENT
Start: 2024-05-06 | End: 2024-05-07 | Stop reason: HOSPADM

## 2024-05-06 RX ORDER — SERTRALINE HYDROCHLORIDE 25 MG/1
25 TABLET, FILM COATED ORAL DAILY
Status: DISCONTINUED | OUTPATIENT
Start: 2024-05-06 | End: 2024-05-07 | Stop reason: HOSPADM

## 2024-05-06 RX ADMIN — ATORVASTATIN CALCIUM 80 MG: 80 TABLET, FILM COATED ORAL at 20:13

## 2024-05-06 RX ADMIN — PANTOPRAZOLE SODIUM 40 MG: 40 INJECTION, POWDER, FOR SOLUTION INTRAVENOUS at 09:11

## 2024-05-06 RX ADMIN — Medication 1000 UNITS: at 17:36

## 2024-05-06 RX ADMIN — PANTOPRAZOLE SODIUM 40 MG: 40 INJECTION, POWDER, FOR SOLUTION INTRAVENOUS at 20:12

## 2024-05-06 RX ADMIN — IOPAMIDOL 100 ML: 755 INJECTION, SOLUTION INTRAVENOUS at 10:14

## 2024-05-06 RX ADMIN — ASPIRIN 81 MG 81 MG: 81 TABLET ORAL at 17:36

## 2024-05-06 RX ADMIN — SODIUM CHLORIDE, POTASSIUM CHLORIDE, SODIUM LACTATE AND CALCIUM CHLORIDE: 600; 310; 30; 20 INJECTION, SOLUTION INTRAVENOUS at 16:49

## 2024-05-06 RX ADMIN — SERTRALINE HYDROCHLORIDE 25 MG: 25 TABLET ORAL at 17:36

## 2024-05-06 RX ADMIN — LOSARTAN POTASSIUM 50 MG: 25 TABLET, FILM COATED ORAL at 20:13

## 2024-05-06 ASSESSMENT — LIFESTYLE VARIABLES
HOW OFTEN DO YOU HAVE A DRINK CONTAINING ALCOHOL: MONTHLY OR LESS
HOW MANY STANDARD DRINKS CONTAINING ALCOHOL DO YOU HAVE ON A TYPICAL DAY: 1 OR 2

## 2024-05-06 ASSESSMENT — PAIN - FUNCTIONAL ASSESSMENT: PAIN_FUNCTIONAL_ASSESSMENT: NONE - DENIES PAIN

## 2024-05-06 ASSESSMENT — ENCOUNTER SYMPTOMS
COUGH: 0
CHEST TIGHTNESS: 0
CONSTIPATION: 0
COLOR CHANGE: 0
SHORTNESS OF BREATH: 0
RESPIRATORY NEGATIVE: 1
BACK PAIN: 0
NAUSEA: 0
DIARRHEA: 0
VOMITING: 0
ABDOMINAL PAIN: 0
ABDOMINAL DISTENTION: 0

## 2024-05-06 NOTE — PROGRESS NOTES
Admission nurse to ED 21, but patient stated that she was going home.  Admission not initiated.  Will return in a little while to see what the decision is about admission or discharge.

## 2024-05-06 NOTE — PROGRESS NOTES
Patient seen in ED, room 21.  Admission completed through DC/Lay Caregiver.  IP nurse will need to begin with allergies and complete the admission including the 4 Eyes Assessment, Immunizations, Vaccines, Rights and Responsibilities, Orientation to room, Plan of Care, Education/Learning Assessment and Education Plan, white board, height and weight, pain assessment and head to toe assessment.  Patient is alert and is being admitted for Upper GI Hemorrhage.  Home Medication Reconciliation completed by Phong Kirby.  All questions answered.    Nurse arrived to take patient up to IP assigned room.  Admission process stopped.

## 2024-05-06 NOTE — PROGRESS NOTES
4 Eyes Skin Assessment     NAME:  Kaylan Jha  YOB: 1930  MEDICAL RECORD NUMBER:  [id number]    The patient is being assessed for  Admission    I agree that at least one RN has performed a thorough Head to Toe Skin Assessment on the patient. ALL assessment sites listed below have been assessed.      Areas assessed by both nurses:    Head, Face, Ears, Shoulders, Back, Chest, Arms, Elbows, Hands, Sacrum. Buttock, Coccyx, Ischium, Legs. Feet and Heels, and Under Medical Devices         Does the Patient have a Wound? Yes wound(s) were present on assessment. LDA wound assessment was Initiated and completed by RN       Milton Prevention initiated by RN: Yes  Wound Care Orders initiated by RN: No    Pressure Injury (Stage 3,4, Unstageable, DTI, NWPT, and Complex wounds) if present, place Wound referral order by RN under : No    New Ostomies, if present place, Ostomy referral order under : No     Nurse 1 eSignature: Electronically signed by Ming Davies RN on 5/6/24 at 5:22 PM EDT    **SHARE this note so that the co-signing nurse can place an eSignature**    Nurse 2 eSignature: Electronically signed by JAMES SINGH RN on 5/6/24 at 5:23 PM EDT

## 2024-05-06 NOTE — CONSULTS
Gastroenterology Consult Note        Patient: Kaylan Jha  : 1930  Acct#:      Date:  2024      1. Melena        Subjective:       History of Present Illness  Patient is a 89 y.o.  female admitted with No admission diagnoses are documented for this encounter. who is seen in consult for black stool.  Patient came to the ER after having 1 black stool Saturday and another black stool .  States the stool was solid.  She had diarrhea last week and then took some Pepto-Bismol after 3 days of diarrhea.  No abdominal pain, nausea, vomiting.  No hematochezia.  Takes a baby aspirin daily.  No other NSAIDs.      Past Medical History:   Diagnosis Date    CAD (coronary artery disease)     Hyperlipidemia       Past Surgical History:   Procedure Laterality Date    CARPAL TUNNEL RELEASE      JOINT REPLACEMENT      Left knee      Past Endoscopic History    Admission Meds  No current facility-administered medications on file prior to encounter.     Current Outpatient Medications on File Prior to Encounter   Medication Sig Dispense Refill    losartan (COZAAR) 50 MG tablet Take 1 tablet by mouth nightly      sertraline (ZOLOFT) 25 MG tablet Take 1 tablet by mouth daily      vitamin E 1000 units capsule Take 1 capsule by mouth daily (Patient not taking: Reported on 2024)      LORazepam (ATIVAN) 0.5 MG tablet Take 1 tablet by mouth daily as needed. (Patient not taking: Reported on 2024)      atorvastatin (LIPITOR) 80 MG tablet TAKE 1 TABLET BY MOUTH DAILY 90 tablet 3    calcium carbonate (OSCAL) 500 MG TABS tablet Take 1 tablet by mouth daily (Patient not taking: Reported on 2024)      Misc Natural Products (OSTEO BI-FLEX ADV TRIPLE ST PO) Take 2 tablets by mouth daily (Patient not taking: Reported on 2024)      vitamin C (ASCORBIC ACID) 500 MG tablet Take 2 tablets by mouth daily (Patient not taking: Reported on 2023)      Glucosamine-Chondroit-Vit C-Mn (GLUCOSAMINE

## 2024-05-06 NOTE — PROGRESS NOTES
Patient has arrived to unit in stable condition. Vitals stable. Patient is awake, alert and oriented. Respirations are easy and unlabored. Patient does not appear to be in distress. Patient oriented to room and call light. Plan of care discussed with patient, patient agreeable. Call light within reach.      Ming Davies RN, BSN

## 2024-05-06 NOTE — PROGRESS NOTES
Pharmacy Home Medication Reconciliation Note    A medication reconciliation has been completed for Kaylan Jha 1/24/1930    Pharmacy: [address]  Information provided by: patient w/ med bottles    The patient's home medication list is as follows:  No current facility-administered medications on file prior to encounter.     Current Outpatient Medications on File Prior to Encounter   Medication Sig Dispense Refill    losartan (COZAAR) 50 MG tablet Take 1 tablet by mouth nightly      carboxymethylcellulose 1 % ophthalmic solution Place 1 drop into both eyes nightly      sertraline (ZOLOFT) 50 MG tablet Take 0.5 tablets by mouth daily      vitamin E 1000 units capsule Take 1 capsule by mouth daily (Patient not taking: Reported on 5/6/2024)      LORazepam (ATIVAN) 0.5 MG tablet Take 1 tablet by mouth daily as needed. (Patient not taking: Reported on 5/6/2024)      atorvastatin (LIPITOR) 80 MG tablet TAKE 1 TABLET BY MOUTH DAILY (Patient taking differently: Take 1 tablet by mouth nightly) 90 tablet 3    calcium carbonate (OSCAL) 500 MG TABS tablet Take 1 tablet by mouth daily (Patient not taking: Reported on 5/6/2024)      Misc Natural Products (OSTEO BI-FLEX ADV TRIPLE ST PO) Take 2 tablets by mouth daily (Patient not taking: Reported on 5/6/2024)      vitamin C (ASCORBIC ACID) 500 MG tablet Take 2 tablets by mouth daily (Patient not taking: Reported on 9/25/2023)      Glucosamine-Chondroit-Vit C-Mn (GLUCOSAMINE CHONDR 500 COMPLEX PO) Take 500 mg by mouth daily  (Patient not taking: Reported on 5/6/2024)      aspirin 81 MG chewable tablet Take 1 tablet by mouth daily 30 tablet 3    vitamin D (CHOLECALCIFEROL) 1000 UNIT TABS tablet Take 1 tablet by mouth daily      Coenzyme Q10 (COQ10 PO) Take by mouth (Patient not taking: Reported on 5/6/2024)         Patient is no longer taking Oscal, CoQ 10, Glucosamine, lorazepam, Osteo Bi-Flex, vitamin C, or vitamin E.    Of note, patient

## 2024-05-06 NOTE — ED PROVIDER NOTES
lower lobes.  Cardiomegaly in the lower chest. Organs: The liver, gallbladder, biliary ducts, pancreas and spleen are normal. Kidneys and adrenal glands are normal. GI/Bowel: The stomach, duodenum and small bowel are normal. A normal appendix is visualized. The colon is normal. Pelvis: The bladder is unremarkable. Small calcified fibroids within the uterus.  Normal adnexa. Peritoneum/Retroperitoneum: No intraperitoneal free air or fluid.  No lymphadenopathy.  Small inguinal hernias bilaterally.  On the right, a loop of small bowel extends into the hernia with no pattern of inflammation or obstruction. Bones/Soft Tissues: No acute skeletal finding.  Advanced degenerative changes throughout the lumbar spine.  Degenerative disc disease with Schmorl's nodes at T12.     1. No evidence of active gastrointestinal bleeding. 2. Small inguinal hernias bilaterally. On the right, a loop of small bowel extends into the hernia with no pattern of inflammation or obstruction.       No results found.    PROCEDURES   Unless otherwise noted below, none     Procedures    CRITICAL CARE TIME (.cctime)       PAST MEDICAL HISTORY      has a past medical history of CAD (coronary artery disease) and Hyperlipidemia.     EMERGENCY DEPARTMENT COURSE and DIFFERENTIAL DIAGNOSIS/MDM:   Vitals:    Vitals:    05/06/24 1242 05/06/24 1300 05/06/24 1400 05/06/24 1523   BP: (!) 179/93 (!) 171/91 (!) 149/84 (!) 159/95   Pulse: 72 70 64 81   Resp:       Temp:       SpO2: 97% 96% 97% 97%   Weight:       Height:           Patient was given the following medications:  Medications   pantoprazole (PROTONIX) injection 40 mg (has no administration in time range)   pantoprazole (PROTONIX) injection 40 mg (40 mg IntraVENous Given 5/6/24 0911)   iopamidol (ISOVUE-370) 76 % injection 100 mL (100 mLs IntraVENous Given 5/6/24 1014)             Is this patient to be included in the SEP-1 Core Measure due to severe sepsis or septic shock?   No   Exclusion criteria - the

## 2024-05-06 NOTE — ED NOTES
How does patient ambulate?   []Low Fall Risk (ambulates by themselves without support)  [x]Stand by assist   []Contact Guard   []Front wheel walker  []Wheelchair   []Steady  []Bed bound  []History of Lower Extremity Amputation  []Unknown, did not assess in the emergency department   How does patient take pills?  []Whole with Water  []Crushed in applesauce  []Crushed in pudding  []Other  [x]Unknown no oral medications were given in the ED  Is patient alert?   [x]Alert  []Drowsy but responds to voice  []Doesn't respond to voice but responds to painful stimuli  []Unresponsive  Is patient oriented?   [x]To person  [x]To place  [x]To time  [x]To situation  []Confused  []Agitated  [x]Follows commands  If patient is disoriented or from a Skill Nursing Facility has family been notified of admission?   []Yes   []No  Patient belongings?   []Cell phone  []Wallet   []Dentures  [x]Clothing  Any specific patient or family belongings/needs/dynamics?   none  Miscellaneous comments/pending orders?  See admit orders     If there are any additional questions please reach out to the Emergency Department.

## 2024-05-06 NOTE — PLAN OF CARE
Problem: Safety - Adult  Goal: Free from fall injury  Outcome: Progressing     Problem: Skin/Tissue Integrity  Goal: Absence of new skin breakdown  Description: 1.  Monitor for areas of redness and/or skin breakdown  2.  Assess vascular access sites hourly  3.  Every 4-6 hours minimum:  Change oxygen saturation probe site  4.  Every 4-6 hours:  If on nasal continuous positive airway pressure, respiratory therapy assess nares and determine need for appliance change or resting period.  Outcome: Progressing     Problem: ABCDS Injury Assessment  Goal: Absence of physical injury  Outcome: Progressing     Problem: Skin/Tissue Integrity - Adult  Goal: Skin integrity remains intact  Outcome: Progressing  Goal: Incisions, wounds, or drain sites healing without S/S of infection  Outcome: Progressing  Goal: Oral mucous membranes remain intact  Outcome: Progressing     Problem: Gastrointestinal - Adult  Goal: Minimal or absence of nausea and vomiting  Outcome: Progressing  Goal: Maintains or returns to baseline bowel function  Outcome: Progressing  Goal: Maintains adequate nutritional intake  Outcome: Progressing  Goal: Establish and maintain optimal ostomy function  Outcome: Progressing

## 2024-05-07 VITALS
DIASTOLIC BLOOD PRESSURE: 79 MMHG | RESPIRATION RATE: 16 BRPM | HEART RATE: 80 BPM | OXYGEN SATURATION: 97 % | BODY MASS INDEX: 23.39 KG/M2 | TEMPERATURE: 97.9 F | WEIGHT: 149 LBS | HEIGHT: 67 IN | SYSTOLIC BLOOD PRESSURE: 133 MMHG

## 2024-05-07 PROBLEM — K92.1 MELENA: Status: ACTIVE | Noted: 2024-05-07

## 2024-05-07 LAB
HCT VFR BLD AUTO: 39.2 % (ref 36–48)
HGB BLD-MCNC: 13.2 G/DL (ref 12–16)

## 2024-05-07 PROCEDURE — G0378 HOSPITAL OBSERVATION PER HR: HCPCS

## 2024-05-07 PROCEDURE — C9113 INJ PANTOPRAZOLE SODIUM, VIA: HCPCS | Performed by: INTERNAL MEDICINE

## 2024-05-07 PROCEDURE — 85018 HEMOGLOBIN: CPT

## 2024-05-07 PROCEDURE — 36415 COLL VENOUS BLD VENIPUNCTURE: CPT

## 2024-05-07 PROCEDURE — 6360000002 HC RX W HCPCS: Performed by: INTERNAL MEDICINE

## 2024-05-07 PROCEDURE — 85014 HEMATOCRIT: CPT

## 2024-05-07 PROCEDURE — 6370000000 HC RX 637 (ALT 250 FOR IP): Performed by: INTERNAL MEDICINE

## 2024-05-07 RX ORDER — LORAZEPAM 0.5 MG/1
0.5 TABLET ORAL ONCE
Status: COMPLETED | OUTPATIENT
Start: 2024-05-07 | End: 2024-05-07

## 2024-05-07 RX ORDER — AMLODIPINE BESYLATE 5 MG/1
5 TABLET ORAL DAILY
Qty: 30 TABLET | Refills: 0 | Status: SHIPPED | OUTPATIENT
Start: 2024-05-07

## 2024-05-07 RX ORDER — OMEPRAZOLE 20 MG/1
20 TABLET, DELAYED RELEASE ORAL DAILY
Qty: 30 TABLET | Refills: 3 | Status: SHIPPED | OUTPATIENT
Start: 2024-05-07

## 2024-05-07 RX ORDER — AMLODIPINE BESYLATE 5 MG/1
5 TABLET ORAL DAILY
Status: DISCONTINUED | OUTPATIENT
Start: 2024-05-07 | End: 2024-05-07 | Stop reason: HOSPADM

## 2024-05-07 RX ADMIN — Medication 1000 UNITS: at 08:53

## 2024-05-07 RX ADMIN — ASPIRIN 81 MG 81 MG: 81 TABLET ORAL at 08:53

## 2024-05-07 RX ADMIN — LORAZEPAM 0.5 MG: 0.5 TABLET ORAL at 10:46

## 2024-05-07 RX ADMIN — SERTRALINE HYDROCHLORIDE 25 MG: 25 TABLET ORAL at 08:53

## 2024-05-07 RX ADMIN — PANTOPRAZOLE SODIUM 40 MG: 40 INJECTION, POWDER, FOR SOLUTION INTRAVENOUS at 08:53

## 2024-05-07 RX ADMIN — AMLODIPINE BESYLATE 5 MG: 5 TABLET ORAL at 10:46

## 2024-05-07 NOTE — PROGRESS NOTES
Shift assessment completed. Routine vitals stable. Scheduled medications given. Patient is awake, alert and oriented x4. Respirations are easy and unlabored. Patient does not appear to be in distress, resting comfortably at this time. Call light within reach. Denies needs at this time. Pain 0/10.    Ming Davies RN, BSN

## 2024-05-07 NOTE — CARE COORDINATION
05/07/24 1209   IMM Letter   Observation Status Letter date given: 05/07/24   Observation Status Letter time given: 1202   Observation Status Letter given to Patient/Family/Significant other/Guardian/POA/by: Case Management     Electronically signed by DOV Jones, KEIW on 5/7/2024 at 12:09 PM

## 2024-05-07 NOTE — PLAN OF CARE
Problem: Safety - Adult  Goal: Free from fall injury  5/7/2024 0134 by Anai Martino RN  Outcome: Progressing  5/6/2024 1727 by Ming Davies RN  Outcome: Progressing     Problem: Skin/Tissue Integrity  Goal: Absence of new skin breakdown  Description: 1.  Monitor for areas of redness and/or skin breakdown  2.  Assess vascular access sites hourly  3.  Every 4-6 hours minimum:  Change oxygen saturation probe site  4.  Every 4-6 hours:  If on nasal continuous positive airway pressure, respiratory therapy assess nares and determine need for appliance change or resting period.  5/7/2024 0134 by Anai Martino RN  Outcome: Progressing  5/6/2024 1727 by Ming Davies RN  Outcome: Progressing     Problem: ABCDS Injury Assessment  Goal: Absence of physical injury  5/7/2024 0134 by Anai Martino RN  Outcome: Progressing  5/6/2024 1727 by Ming Davies RN  Outcome: Progressing     Problem: Skin/Tissue Integrity - Adult  Goal: Skin integrity remains intact  5/7/2024 0134 by Anai Martino RN  Outcome: Progressing  5/6/2024 1727 by Ming Davies RN  Outcome: Progressing  Goal: Incisions, wounds, or drain sites healing without S/S of infection  5/7/2024 0134 by Anai Martino RN  Outcome: Progressing  5/6/2024 1727 by Ming Davies RN  Outcome: Progressing  Goal: Oral mucous membranes remain intact  5/7/2024 0134 by Anai Martino RN  Outcome: Progressing  5/6/2024 1727 by Ming Davies RN  Outcome: Progressing     Problem: Gastrointestinal - Adult  Goal: Minimal or absence of nausea and vomiting  5/7/2024 0134 by Anai Martino RN  Outcome: Progressing  5/6/2024 1727 by Ming Davies RN  Outcome: Progressing  Goal: Maintains or returns to baseline bowel function  5/7/2024 0134 by Anai Martino RN  Outcome: Progressing  5/6/2024 1727 by Ming Davies RN  Outcome: Progressing  Goal: Maintains adequate nutritional intake  5/7/2024 0134 by Anai Martino, RN  Outcome: Progressing  5/6/2024 5903

## 2024-05-07 NOTE — PROGRESS NOTES
RN discharge summary from [facility] to home.     This patient has had a discharge order placed. They are returning home and being picked up in the lobby. Discharge paperwork has been printed, highlighted, and gone over with the patient by this RN. Patient understands teaching and has no further questions at this time. IV has been removed with no complications. Pt has all belongings present.      Ming Davies RN, BSN

## 2024-05-07 NOTE — PROGRESS NOTES
Pt explained to RN that she is feeling very anxious and would like to have something to help calm her down. No PRN available at this time, MD notified via [employer]. BP also slightly elevated at 164/91 MD notified via [employer].    Ming Davies RN, BSN

## 2024-05-07 NOTE — PLAN OF CARE
Problem: Safety - Adult  Goal: Free from fall injury  5/7/2024 0921 by Ming Davies RN  Outcome: Progressing  5/7/2024 0134 by Anai Martino RN  Outcome: Progressing     Problem: Skin/Tissue Integrity  Goal: Absence of new skin breakdown  Description: 1.  Monitor for areas of redness and/or skin breakdown  2.  Assess vascular access sites hourly  3.  Every 4-6 hours minimum:  Change oxygen saturation probe site  4.  Every 4-6 hours:  If on nasal continuous positive airway pressure, respiratory therapy assess nares and determine need for appliance change or resting period.  5/7/2024 0921 by Ming Davies RN  Outcome: Progressing  5/7/2024 0134 by Anai Martino RN  Outcome: Progressing     Problem: ABCDS Injury Assessment  Goal: Absence of physical injury  5/7/2024 0921 by Ming Davies RN  Outcome: Progressing  5/7/2024 0134 by Anai Martino RN  Outcome: Progressing     Problem: Skin/Tissue Integrity - Adult  Goal: Skin integrity remains intact  5/7/2024 0921 by Ming Davies RN  Outcome: Progressing  5/7/2024 0134 by Anai Martino RN  Outcome: Progressing  Goal: Incisions, wounds, or drain sites healing without S/S of infection  5/7/2024 0921 by Ming Davies RN  Outcome: Progressing  5/7/2024 0134 by Anai Martino RN  Outcome: Progressing  Goal: Oral mucous membranes remain intact  5/7/2024 0921 by Ming Davies RN  Outcome: Progressing  5/7/2024 0134 by Anai Martino RN  Outcome: Progressing     Problem: Gastrointestinal - Adult  Goal: Minimal or absence of nausea and vomiting  5/7/2024 0921 by Ming Davies RN  Outcome: Progressing  5/7/2024 0134 by Anai Martino RN  Outcome: Progressing  Goal: Maintains or returns to baseline bowel function  5/7/2024 0921 by Ming Davies RN  Outcome: Progressing  5/7/2024 0134 by Anai Martino RN  Outcome: Progressing  Goal: Maintains adequate nutritional intake  5/7/2024 0921 by Ming Davies, RN  Outcome: Progressing  5/7/2024 0134

## 2024-05-07 NOTE — PLAN OF CARE
Problem: Safety - Adult  Goal: Free from fall injury  5/7/2024 1121 by Ming Davies RN  Outcome: Adequate for Discharge  5/7/2024 0921 by Ming Davies RN  Outcome: Progressing  5/7/2024 0134 by Anai Martino RN  Outcome: Progressing     Problem: Skin/Tissue Integrity  Goal: Absence of new skin breakdown  Description: 1.  Monitor for areas of redness and/or skin breakdown  2.  Assess vascular access sites hourly  3.  Every 4-6 hours minimum:  Change oxygen saturation probe site  4.  Every 4-6 hours:  If on nasal continuous positive airway pressure, respiratory therapy assess nares and determine need for appliance change or resting period.  5/7/2024 1121 by Ming Davies RN  Outcome: Adequate for Discharge  5/7/2024 0921 by Ming Davies RN  Outcome: Progressing  5/7/2024 0134 by Anai Martino RN  Outcome: Progressing     Problem: ABCDS Injury Assessment  Goal: Absence of physical injury  5/7/2024 1121 by Ming Davies RN  Outcome: Adequate for Discharge  5/7/2024 0921 by Ming Davies RN  Outcome: Progressing  5/7/2024 0134 by Anai Martino RN  Outcome: Progressing     Problem: Skin/Tissue Integrity - Adult  Goal: Skin integrity remains intact  5/7/2024 1121 by Ming Davies RN  Outcome: Adequate for Discharge  5/7/2024 0921 by Ming Davies RN  Outcome: Progressing  5/7/2024 0134 by Anai Martino RN  Outcome: Progressing  Goal: Incisions, wounds, or drain sites healing without S/S of infection  5/7/2024 1121 by Ming Davies RN  Outcome: Adequate for Discharge  5/7/2024 0921 by Ming Davies RN  Outcome: Progressing  5/7/2024 0134 by Anai Martino RN  Outcome: Progressing  Goal: Oral mucous membranes remain intact  5/7/2024 1121 by Ming Davies RN  Outcome: Adequate for Discharge  5/7/2024 0921 by Ming Davies RN  Outcome: Progressing  5/7/2024 0134 by Anai Martino, RN  Outcome: Progressing     Problem: Gastrointestinal - Adult  Goal: Minimal or absence of nausea and

## 2024-05-07 NOTE — H&P
[place] [address]                           HISTORY & PHYSICAL      PATIENT NAME: WALLY DE LEON           : 1930  MED REC NO: [id number]                      ROOM: [address] [place] [address]  ACCOUNT NO: [id number]                       ADMIT DATE: 2024  PROVIDER: Chavez Ruiz MD      HISTORY OF PRESENT ILLNESS:  The patient is a 89-year-old white American lady from [facility], came to the emergency room with series of melenic stools without any abdominal discomfort.  No fever.  No chills.  No diaphoresis.  Does feel some general fatigue.  The patient does not take any iron therapy.  She has not had any hematemesis.  No abdominal pain or cramping.  No recent weight loss.  No constipation.  She is fairly independent and living at [facility].  There is no history of GI bleeding in the past.  There is no bright red blood per rectum.  There is no flank pain or back pain.  She is not on any oral anticoagulation.  There is no unusual NSAID usage except she does take aspirin.  Her last colonoscopy was 10 years ago and it was unremarkable.  She does not recall having any EGD done.  There is no syncope, lightheadedness, loss of consciousness or dizziness.    PAST MEDICAL HISTORY:  Pertinent for atherosclerotic heart disease, hyperlipidemia.    PAST SURGICAL HISTORY:  Pertinent for left-sided total knee replacement and carpal tunnel release.    MEDICATIONS:  The patient is on aspirin, atorvastatin, carboxymethylcellulose ophthalmic solution, losartan, pantoprazole, sertraline, and vitamin D.    ALLERGIES:  SHE IS ALLERGIC TO LISINOPRIL AND NAPROXEN.      SOCIAL HISTORY:  She is a .  She has 2 grown children, few grandchildren.  Her children are adopted.  She used to work as a  and later on she became [employer] .  There is no history of substance abuse.  There is no history of smoking.  No history of

## 2024-05-07 NOTE — PROGRESS NOTES
Hospital Problems             Last Modified POA    * (Principal) Upper GI hemorrhage 5/6/2024 Yes    ASHD (arteriosclerotic heart disease) 5/7/2024 Yes    Degenerative joint disease 5/7/2024 Yes    Melena 5/7/2024 Yes   H&P dictated    24 hour observation   Patient prefers to be discharged unless there is emergent need for endoscopy    Ct PPI and H/H monitoring

## 2024-05-07 NOTE — CARE COORDINATION
Chart reviewed at this time for discharge planning.     Patient is from independent living at [facility].     There is a GI consult pending.     CM will follow for discharge plan and needs.    Georgette Beard RN, BSN  [phone]

## 2024-06-14 RX ORDER — ATORVASTATIN CALCIUM 80 MG/1
80 TABLET, FILM COATED ORAL DAILY
Qty: 90 TABLET | Refills: 3 | Status: SHIPPED | OUTPATIENT
Start: 2024-06-14

## 2024-06-14 NOTE — TELEPHONE ENCOUNTER
Last ov:9/25/23 DCE   Next ov:9/24/24 NPTS   Last EKG:10/30/2020  Last labs:5/06/24  Last filled:   Disp Refills Start End    atorvastatin (LIPITOR) 80 MG tablet 90 tablet 3 6/20/2023 --    Sig - Route: TAKE 1 TABLET BY MOUTH DAILY - Oral    Patient taking differently: Take 1 tablet by mouth nightly    Sent to pharmacy as: Atorvastatin Calcium 80 MG Oral Tablet (LIPITOR)    Cosign for Ordering: Accepted by Richard Kapoor MD on 7/2/2023  4:11 PM    E-Prescribing Status: Receipt confirmed by pharmacy (6/20/2023  1:35 PM EDT)

## 2024-09-24 ENCOUNTER — OFFICE VISIT (OUTPATIENT)
Dept: CARDIOLOGY CLINIC | Age: 89
End: 2024-09-24

## 2024-09-24 VITALS
SYSTOLIC BLOOD PRESSURE: 134 MMHG | OXYGEN SATURATION: 95 % | HEIGHT: 67 IN | WEIGHT: 157 LBS | BODY MASS INDEX: 24.64 KG/M2 | HEART RATE: 62 BPM | DIASTOLIC BLOOD PRESSURE: 70 MMHG

## 2024-09-24 DIAGNOSIS — I25.10 CORONARY ARTERY DISEASE DUE TO LIPID RICH PLAQUE: Primary | ICD-10-CM

## 2024-09-24 DIAGNOSIS — I51.9 LV DYSFUNCTION: ICD-10-CM

## 2024-09-24 DIAGNOSIS — I25.83 CORONARY ARTERY DISEASE DUE TO LIPID RICH PLAQUE: Primary | ICD-10-CM

## 2024-09-24 DIAGNOSIS — E78.00 HYPERCHOLESTEROLEMIA: ICD-10-CM

## 2024-09-24 RX ORDER — MEMANTINE HYDROCHLORIDE 5 MG/1
5 TABLET ORAL 2 TIMES DAILY
COMMUNITY
Start: 2024-09-19

## 2024-09-24 RX ORDER — CHOLECALCIFEROL (VITAMIN D3) 1250 MCG
CAPSULE ORAL
COMMUNITY

## 2024-09-30 ENCOUNTER — OFFICE VISIT (OUTPATIENT)
Dept: ENT CLINIC | Age: 89
End: 2024-09-30
Payer: MEDICARE

## 2024-09-30 VITALS
HEART RATE: 81 BPM | DIASTOLIC BLOOD PRESSURE: 77 MMHG | BODY MASS INDEX: 23.64 KG/M2 | OXYGEN SATURATION: 93 % | HEIGHT: 68 IN | TEMPERATURE: 96.8 F | WEIGHT: 156 LBS | SYSTOLIC BLOOD PRESSURE: 137 MMHG

## 2024-09-30 DIAGNOSIS — H91.93 BILATERAL HEARING LOSS, UNSPECIFIED HEARING LOSS TYPE: ICD-10-CM

## 2024-09-30 DIAGNOSIS — H61.23 BILATERAL IMPACTED CERUMEN: Primary | ICD-10-CM

## 2024-09-30 PROCEDURE — 69210 REMOVE IMPACTED EAR WAX UNI: CPT | Performed by: STUDENT IN AN ORGANIZED HEALTH CARE EDUCATION/TRAINING PROGRAM

## 2024-09-30 PROCEDURE — G8428 CUR MEDS NOT DOCUMENT: HCPCS | Performed by: STUDENT IN AN ORGANIZED HEALTH CARE EDUCATION/TRAINING PROGRAM

## 2024-09-30 PROCEDURE — 1123F ACP DISCUSS/DSCN MKR DOCD: CPT | Performed by: STUDENT IN AN ORGANIZED HEALTH CARE EDUCATION/TRAINING PROGRAM

## 2024-09-30 PROCEDURE — 99203 OFFICE O/P NEW LOW 30 MIN: CPT | Performed by: STUDENT IN AN ORGANIZED HEALTH CARE EDUCATION/TRAINING PROGRAM

## 2024-09-30 PROCEDURE — 1036F TOBACCO NON-USER: CPT | Performed by: STUDENT IN AN ORGANIZED HEALTH CARE EDUCATION/TRAINING PROGRAM

## 2024-09-30 PROCEDURE — G8420 CALC BMI NORM PARAMETERS: HCPCS | Performed by: STUDENT IN AN ORGANIZED HEALTH CARE EDUCATION/TRAINING PROGRAM

## 2024-09-30 PROCEDURE — 1090F PRES/ABSN URINE INCON ASSESS: CPT | Performed by: STUDENT IN AN ORGANIZED HEALTH CARE EDUCATION/TRAINING PROGRAM

## 2024-09-30 NOTE — PROGRESS NOTES
[facility]  DIVISION OF OTOLARYNGOLOGY- HEAD & NECK SURGERY  NEW PATIENT HISTORY AND PHYSICAL NOTE      Patient Name: Kaylan Jha  Medical Record Number:  [id number]  Primary Care Physician:  Ace De Jesus III, MD    ChiefComplaint     Chief Complaint   Patient presents with    Cerumen Impaction     Ear cleaning - pt has no c/o.       History of Present Illness     Kaylan Jha is an 89 y.o. female presenting with cerumen impaction noted by her audiologist.  She got a new set of hearing aids 4 days ago.  She resides at a nursing care facility, she presents today with her caregiver who helps provide medical history. Denies recent acute hearing changes. Denies tinnitus.  No otalgia or otorrhea.  No history of chronic ear infections.  No history of otologic surgery.  No family history of early onset hearing loss.  No loud noise exposures. Denies vertigo or dizziness.         Past Medical History     Past Medical History:   Diagnosis Date    CAD (coronary artery disease)     Hyperlipidemia        Past Surgical History     Past Surgical History:   Procedure Laterality Date    CARPAL TUNNEL RELEASE      HIP SURGERY      JOINT REPLACEMENT      Left knee       Family History     Family History   Problem Relation Age of Onset    Heart Disease Father     Heart Attack Brother        Social History     Social History     Tobacco Use    Smoking status: Former     Current packs/day: 0.00     Average packs/day: 0.3 packs/day for 4.0 years (1.0 ttl pk-yrs)     Types: Cigarettes     Start date: 1950     Quit date: 1954     Years since quittin.2    Smokeless tobacco: Never   Vaping Use    Vaping status: Never Used   Substance Use Topics    Alcohol use: Yes     Alcohol/week: 3.0 standard drinks of alcohol     Types: 1 Glasses of wine, 1 Cans of beer, 1 Shots of liquor per week     Comment: RARE    Drug use: No        Allergies     Allergies   Allergen Reactions    Lisinopril Other (See Comments)